# Patient Record
Sex: FEMALE | Race: WHITE | URBAN - NONMETROPOLITAN AREA
[De-identification: names, ages, dates, MRNs, and addresses within clinical notes are randomized per-mention and may not be internally consistent; named-entity substitution may affect disease eponyms.]

---

## 2018-12-14 ENCOUNTER — APPOINTMENT (RX ONLY)
Dept: URBAN - NONMETROPOLITAN AREA CLINIC 1 | Facility: CLINIC | Age: 78
Setting detail: DERMATOLOGY
End: 2018-12-14

## 2018-12-14 DIAGNOSIS — L57.0 ACTINIC KERATOSIS: ICD-10-CM

## 2018-12-14 DIAGNOSIS — L82.1 OTHER SEBORRHEIC KERATOSIS: ICD-10-CM

## 2018-12-14 PROCEDURE — 17003 DESTRUCT PREMALG LES 2-14: CPT

## 2018-12-14 PROCEDURE — 17000 DESTRUCT PREMALG LESION: CPT

## 2018-12-14 PROCEDURE — ? LIQUID NITROGEN

## 2018-12-14 PROCEDURE — ? COUNSELING

## 2018-12-14 PROCEDURE — 99202 OFFICE O/P NEW SF 15 MIN: CPT | Mod: 25

## 2018-12-14 ASSESSMENT — LOCATION DETAILED DESCRIPTION DERM
LOCATION DETAILED: LEFT PROXIMAL PRETIBIAL REGION
LOCATION DETAILED: RIGHT RADIAL DORSAL HAND
LOCATION DETAILED: LEFT LATERAL PROXIMAL PRETIBIAL REGION
LOCATION DETAILED: INFERIOR THORACIC SPINE
LOCATION DETAILED: RIGHT PROXIMAL DORSAL THUMB
LOCATION DETAILED: 2ND WEB SPACE RIGHT HAND
LOCATION DETAILED: RIGHT LATERAL EYEBROW
LOCATION DETAILED: RIGHT PROXIMAL DORSAL MIDDLE FINGER
LOCATION DETAILED: RIGHT ANTERIOR DISTAL THIGH
LOCATION DETAILED: RIGHT MEDIAL CANTHUS

## 2018-12-14 ASSESSMENT — LOCATION SIMPLE DESCRIPTION DERM
LOCATION SIMPLE: RIGHT THIGH
LOCATION SIMPLE: LEFT PRETIBIAL REGION
LOCATION SIMPLE: RIGHT EYELID
LOCATION SIMPLE: RIGHT HAND
LOCATION SIMPLE: UPPER BACK
LOCATION SIMPLE: RIGHT MIDDLE FINGER
LOCATION SIMPLE: RIGHT EYEBROW
LOCATION SIMPLE: RIGHT THUMB

## 2018-12-14 ASSESSMENT — LOCATION ZONE DERM
LOCATION ZONE: HAND
LOCATION ZONE: EYELID
LOCATION ZONE: LEG
LOCATION ZONE: FINGER
LOCATION ZONE: TRUNK
LOCATION ZONE: FACE

## 2019-03-08 ENCOUNTER — APPOINTMENT (RX ONLY)
Dept: URBAN - NONMETROPOLITAN AREA CLINIC 1 | Facility: CLINIC | Age: 79
Setting detail: DERMATOLOGY
End: 2019-03-08

## 2019-03-08 DIAGNOSIS — L72.0 EPIDERMAL CYST: ICD-10-CM

## 2019-03-08 PROBLEM — D48.5 NEOPLASM OF UNCERTAIN BEHAVIOR OF SKIN: Status: ACTIVE | Noted: 2019-03-08

## 2019-03-08 PROCEDURE — 11103 TANGNTL BX SKIN EA SEP/ADDL: CPT

## 2019-03-08 PROCEDURE — ? BENIGN DESTRUCTION COSMETIC

## 2019-03-08 PROCEDURE — 11102 TANGNTL BX SKIN SINGLE LES: CPT

## 2019-03-08 PROCEDURE — ? BIOPSY BY SHAVE METHOD

## 2019-03-08 ASSESSMENT — LOCATION DETAILED DESCRIPTION DERM: LOCATION DETAILED: LEFT INFERIOR CENTRAL MALAR CHEEK

## 2019-03-08 ASSESSMENT — LOCATION ZONE DERM: LOCATION ZONE: FACE

## 2019-03-08 ASSESSMENT — LOCATION SIMPLE DESCRIPTION DERM: LOCATION SIMPLE: LEFT CHEEK

## 2019-03-08 NOTE — PROCEDURE: BIOPSY BY SHAVE METHOD
Consent: Written consent was obtained and risks were reviewed including but not limited to scarring, infection, bleeding, scabbing, incomplete removal, nerve damage and allergy to anesthesia.
Anesthesia Type: 1% lidocaine with epinephrine and a 1:10 solution of 8.4% sodium bicarbonate
Lab Facility: [id number]
Destruction After The Procedure: Yes
Depth Of Biopsy: dermis
Cryotherapy Text: The wound bed was treated with cryotherapy after the biopsy was performed.
Bill For Surgical Tray: no
Size Of Lesion In Cm: 0.4
Lab: [id number]
Biopsy Type: H and E
Electrodesiccation Text: The wound bed was treated with electrodesiccation after the biopsy was performed.
Wound Care: Vaseline
Additional Anesthesia Volume In Cc (Will Not Render If 0): 0
Anesthesia Volume In Cc: 1
Dressing: Band-Aid
Electrodesiccation And Curettage Text: The wound bed was treated with electrodesiccation and curettage after the biopsy was performed.
Billing Type: Third-Party Bill
Type Of Destruction Used: Electrodesiccation and Curettage
Curettage Text: The wound bed was treated with curettage after the biopsy was done.
Hemostasis: Electrodesiccation
Notification Instructions: Patient will be notified of biopsy results. However, patient instructed to call the office if not contacted within 2 weeks.
Detail Level: Simple
Biopsy Method: Dermablade
Post-Care Instructions: I reviewed with the patient in detail post-care instructions. Patient is to keep the biopsy site dry overnight, and then apply Polysporin twice daily until healed. Patient may apply hydrogen peroxide soaks to remove any crusting.
Silver Nitrate Text: The wound bed was treated with silver nitrate after the biopsy was performed.
X Size Of Lesion In Cm: 0.3
Body Location Override (Optional - Billing Will Still Be Based On Selected Body Map Location If Applicable): r nasal root

## 2020-06-11 ENCOUNTER — APPOINTMENT (OUTPATIENT)
Dept: RADIOLOGY | Facility: MEDICAL CENTER | Age: 80
DRG: 175 | End: 2020-06-11
Attending: STUDENT IN AN ORGANIZED HEALTH CARE EDUCATION/TRAINING PROGRAM
Payer: COMMERCIAL

## 2020-06-11 ENCOUNTER — HOSPITAL ENCOUNTER (INPATIENT)
Facility: MEDICAL CENTER | Age: 80
LOS: 5 days | DRG: 175 | End: 2020-06-16
Attending: EMERGENCY MEDICINE | Admitting: INTERNAL MEDICINE
Payer: COMMERCIAL

## 2020-06-11 ENCOUNTER — APPOINTMENT (OUTPATIENT)
Dept: RADIOLOGY | Facility: MEDICAL CENTER | Age: 80
DRG: 175 | End: 2020-06-11
Attending: EMERGENCY MEDICINE
Payer: COMMERCIAL

## 2020-06-11 PROBLEM — R79.89 ELEVATED TROPONIN: Status: ACTIVE | Noted: 2020-06-11

## 2020-06-11 PROBLEM — N17.9 AKI (ACUTE KIDNEY INJURY) (HCC): Status: ACTIVE | Noted: 2020-06-11

## 2020-06-11 PROBLEM — J18.9 COMMUNITY ACQUIRED PNEUMONIA: Status: ACTIVE | Noted: 2020-06-11

## 2020-06-11 PROBLEM — I10 HYPERTENSION: Status: ACTIVE | Noted: 2020-06-11

## 2020-06-11 PROBLEM — E87.29 METABOLIC ACIDOSIS, INCREASED ANION GAP (IAG): Status: ACTIVE | Noted: 2020-06-11

## 2020-06-11 LAB
ALBUMIN SERPL BCP-MCNC: 3.6 G/DL (ref 3.2–4.9)
ALBUMIN/GLOB SERPL: 1.1 G/DL
ALP SERPL-CCNC: 81 U/L (ref 30–99)
ALT SERPL-CCNC: 26 U/L (ref 2–50)
ANION GAP SERPL CALC-SCNC: 21 MMOL/L (ref 7–16)
AST SERPL-CCNC: 36 U/L (ref 12–45)
BASOPHILS # BLD AUTO: 0.5 % (ref 0–1.8)
BASOPHILS # BLD: 0.06 K/UL (ref 0–0.12)
BILIRUB SERPL-MCNC: 0.8 MG/DL (ref 0.1–1.5)
BUN SERPL-MCNC: 35 MG/DL (ref 8–22)
CALCIUM SERPL-MCNC: 8.9 MG/DL (ref 8.5–10.5)
CHLORIDE SERPL-SCNC: 102 MMOL/L (ref 96–112)
CO2 SERPL-SCNC: 16 MMOL/L (ref 20–33)
COVID ORDER STATUS COVID19: NORMAL
CREAT SERPL-MCNC: 1.45 MG/DL (ref 0.5–1.4)
D DIMER PPP IA.FEU-MCNC: 17.03 UG/ML (FEU) (ref 0–0.5)
EKG IMPRESSION: NORMAL
EOSINOPHIL # BLD AUTO: 0.06 K/UL (ref 0–0.51)
EOSINOPHIL NFR BLD: 0.5 % (ref 0–6.9)
ERYTHROCYTE [DISTWIDTH] IN BLOOD BY AUTOMATED COUNT: 50.6 FL (ref 35.9–50)
GLOBULIN SER CALC-MCNC: 3.4 G/DL (ref 1.9–3.5)
GLUCOSE SERPL-MCNC: 127 MG/DL (ref 65–99)
HCT VFR BLD AUTO: 43.7 % (ref 37–47)
HGB BLD-MCNC: 14.2 G/DL (ref 12–16)
IMM GRANULOCYTES # BLD AUTO: 0.06 K/UL (ref 0–0.11)
IMM GRANULOCYTES NFR BLD AUTO: 0.5 % (ref 0–0.9)
LACTATE BLD-SCNC: 4.1 MMOL/L (ref 0.5–2)
LACTATE BLD-SCNC: 4.4 MMOL/L (ref 0.5–2)
LYMPHOCYTES # BLD AUTO: 1.87 K/UL (ref 1–4.8)
LYMPHOCYTES NFR BLD: 15.3 % (ref 22–41)
MAGNESIUM SERPL-MCNC: 2.4 MG/DL (ref 1.5–2.5)
MCH RBC QN AUTO: 31.3 PG (ref 27–33)
MCHC RBC AUTO-ENTMCNC: 32.5 G/DL (ref 33.6–35)
MCV RBC AUTO: 96.5 FL (ref 81.4–97.8)
MONOCYTES # BLD AUTO: 1.27 K/UL (ref 0–0.85)
MONOCYTES NFR BLD AUTO: 10.4 % (ref 0–13.4)
NEUTROPHILS # BLD AUTO: 8.88 K/UL (ref 2–7.15)
NEUTROPHILS NFR BLD: 72.8 % (ref 44–72)
NRBC # BLD AUTO: 0 K/UL
NRBC BLD-RTO: 0 /100 WBC
PLATELET # BLD AUTO: 175 K/UL (ref 164–446)
PMV BLD AUTO: 10.3 FL (ref 9–12.9)
POTASSIUM SERPL-SCNC: 5.2 MMOL/L (ref 3.6–5.5)
PROCALCITONIN SERPL-MCNC: <0.05 NG/ML
PROT SERPL-MCNC: 7 G/DL (ref 6–8.2)
RBC # BLD AUTO: 4.53 M/UL (ref 4.2–5.4)
SARS-COV-2 RNA RESP QL NAA+PROBE: NOTDETECTED
SODIUM SERPL-SCNC: 139 MMOL/L (ref 135–145)
SPECIMEN SOURCE: NORMAL
TROPONIN T SERPL-MCNC: 72 NG/L (ref 6–19)
TROPONIN T SERPL-MCNC: 73 NG/L (ref 6–19)
WBC # BLD AUTO: 12.2 K/UL (ref 4.8–10.8)

## 2020-06-11 PROCEDURE — 770020 HCHG ROOM/CARE - TELE (206)

## 2020-06-11 PROCEDURE — 87040 BLOOD CULTURE FOR BACTERIA: CPT | Mod: 91

## 2020-06-11 PROCEDURE — 85379 FIBRIN DEGRADATION QUANT: CPT

## 2020-06-11 PROCEDURE — 83605 ASSAY OF LACTIC ACID: CPT | Mod: 91

## 2020-06-11 PROCEDURE — 85025 COMPLETE CBC W/AUTO DIFF WBC: CPT

## 2020-06-11 PROCEDURE — 700117 HCHG RX CONTRAST REV CODE 255: Performed by: STUDENT IN AN ORGANIZED HEALTH CARE EDUCATION/TRAINING PROGRAM

## 2020-06-11 PROCEDURE — 93005 ELECTROCARDIOGRAM TRACING: CPT | Performed by: STUDENT IN AN ORGANIZED HEALTH CARE EDUCATION/TRAINING PROGRAM

## 2020-06-11 PROCEDURE — 96365 THER/PROPH/DIAG IV INF INIT: CPT

## 2020-06-11 PROCEDURE — 99285 EMERGENCY DEPT VISIT HI MDM: CPT

## 2020-06-11 PROCEDURE — 83735 ASSAY OF MAGNESIUM: CPT

## 2020-06-11 PROCEDURE — 700111 HCHG RX REV CODE 636 W/ 250 OVERRIDE (IP): Performed by: EMERGENCY MEDICINE

## 2020-06-11 PROCEDURE — C9803 HOPD COVID-19 SPEC COLLECT: HCPCS | Performed by: EMERGENCY MEDICINE

## 2020-06-11 PROCEDURE — 93971 EXTREMITY STUDY: CPT | Mod: RT

## 2020-06-11 PROCEDURE — 700105 HCHG RX REV CODE 258: Performed by: STUDENT IN AN ORGANIZED HEALTH CARE EDUCATION/TRAINING PROGRAM

## 2020-06-11 PROCEDURE — 700111 HCHG RX REV CODE 636 W/ 250 OVERRIDE (IP): Performed by: STUDENT IN AN ORGANIZED HEALTH CARE EDUCATION/TRAINING PROGRAM

## 2020-06-11 PROCEDURE — 84484 ASSAY OF TROPONIN QUANT: CPT

## 2020-06-11 PROCEDURE — 700101 HCHG RX REV CODE 250: Performed by: STUDENT IN AN ORGANIZED HEALTH CARE EDUCATION/TRAINING PROGRAM

## 2020-06-11 PROCEDURE — 93971 EXTREMITY STUDY: CPT | Mod: 26 | Performed by: INTERNAL MEDICINE

## 2020-06-11 PROCEDURE — 84145 PROCALCITONIN (PCT): CPT

## 2020-06-11 PROCEDURE — A9270 NON-COVERED ITEM OR SERVICE: HCPCS | Performed by: STUDENT IN AN ORGANIZED HEALTH CARE EDUCATION/TRAINING PROGRAM

## 2020-06-11 PROCEDURE — U0004 COV-19 TEST NON-CDC HGH THRU: HCPCS

## 2020-06-11 PROCEDURE — 700105 HCHG RX REV CODE 258: Performed by: EMERGENCY MEDICINE

## 2020-06-11 PROCEDURE — 96375 TX/PRO/DX INJ NEW DRUG ADDON: CPT

## 2020-06-11 PROCEDURE — 93005 ELECTROCARDIOGRAM TRACING: CPT | Performed by: EMERGENCY MEDICINE

## 2020-06-11 PROCEDURE — 700102 HCHG RX REV CODE 250 W/ 637 OVERRIDE(OP): Performed by: STUDENT IN AN ORGANIZED HEALTH CARE EDUCATION/TRAINING PROGRAM

## 2020-06-11 PROCEDURE — 71275 CT ANGIOGRAPHY CHEST: CPT

## 2020-06-11 PROCEDURE — 94640 AIRWAY INHALATION TREATMENT: CPT

## 2020-06-11 PROCEDURE — 71045 X-RAY EXAM CHEST 1 VIEW: CPT

## 2020-06-11 PROCEDURE — 80053 COMPREHEN METABOLIC PANEL: CPT

## 2020-06-11 RX ORDER — IPRATROPIUM BROMIDE AND ALBUTEROL SULFATE 2.5; .5 MG/3ML; MG/3ML
3 SOLUTION RESPIRATORY (INHALATION)
Status: DISCONTINUED | OUTPATIENT
Start: 2020-06-11 | End: 2020-06-11

## 2020-06-11 RX ORDER — AZITHROMYCIN 500 MG/1
500 INJECTION, POWDER, LYOPHILIZED, FOR SOLUTION INTRAVENOUS ONCE
Status: COMPLETED | OUTPATIENT
Start: 2020-06-11 | End: 2020-06-11

## 2020-06-11 RX ORDER — PREDNISONE 20 MG/1
40 TABLET ORAL DAILY
Status: DISCONTINUED | OUTPATIENT
Start: 2020-06-11 | End: 2020-06-12

## 2020-06-11 RX ORDER — CHOLECALCIFEROL (VITAMIN D3) 125 MCG
5000 CAPSULE ORAL DAILY
Status: ON HOLD | COMMUNITY
End: 2023-12-27

## 2020-06-11 RX ORDER — IPRATROPIUM BROMIDE AND ALBUTEROL SULFATE 2.5; .5 MG/3ML; MG/3ML
3 SOLUTION RESPIRATORY (INHALATION)
Status: DISCONTINUED | OUTPATIENT
Start: 2020-06-11 | End: 2020-06-12

## 2020-06-11 RX ORDER — LISINOPRIL 20 MG/1
20 TABLET ORAL DAILY
Status: DISCONTINUED | OUTPATIENT
Start: 2020-06-12 | End: 2020-06-11

## 2020-06-11 RX ORDER — FUROSEMIDE 20 MG/1
20 TABLET ORAL DAILY
Status: DISCONTINUED | OUTPATIENT
Start: 2020-06-12 | End: 2020-06-11

## 2020-06-11 RX ORDER — DILTIAZEM HYDROCHLORIDE 120 MG/1
240 TABLET, FILM COATED ORAL 3 TIMES DAILY
Status: DISCONTINUED | OUTPATIENT
Start: 2020-06-11 | End: 2020-06-12

## 2020-06-11 RX ORDER — DOXYCYCLINE 100 MG/1
100 TABLET ORAL EVERY 12 HOURS
Status: DISCONTINUED | OUTPATIENT
Start: 2020-06-12 | End: 2020-06-12

## 2020-06-11 RX ORDER — HEPARIN SODIUM 5000 [USP'U]/ML
5000 INJECTION, SOLUTION INTRAVENOUS; SUBCUTANEOUS EVERY 8 HOURS
Status: DISCONTINUED | OUTPATIENT
Start: 2020-06-11 | End: 2020-06-11

## 2020-06-11 RX ORDER — BUDESONIDE AND FORMOTEROL FUMARATE DIHYDRATE 160; 4.5 UG/1; UG/1
2 AEROSOL RESPIRATORY (INHALATION)
Status: DISCONTINUED | OUTPATIENT
Start: 2020-06-11 | End: 2020-06-12

## 2020-06-11 RX ORDER — SODIUM CHLORIDE, SODIUM LACTATE, POTASSIUM CHLORIDE, CALCIUM CHLORIDE 600; 310; 30; 20 MG/100ML; MG/100ML; MG/100ML; MG/100ML
1000 INJECTION, SOLUTION INTRAVENOUS ONCE
Status: COMPLETED | OUTPATIENT
Start: 2020-06-11 | End: 2020-06-11

## 2020-06-11 RX ORDER — LISINOPRIL 20 MG/1
20 TABLET ORAL DAILY
Status: ON HOLD | COMMUNITY
End: 2023-12-27

## 2020-06-11 RX ORDER — SODIUM CHLORIDE 9 MG/ML
INJECTION, SOLUTION INTRAVENOUS CONTINUOUS
Status: DISCONTINUED | OUTPATIENT
Start: 2020-06-11 | End: 2020-06-16

## 2020-06-11 RX ORDER — POLYETHYLENE GLYCOL 3350 17 G/17G
1 POWDER, FOR SOLUTION ORAL
Status: DISCONTINUED | OUTPATIENT
Start: 2020-06-11 | End: 2020-06-16 | Stop reason: HOSPADM

## 2020-06-11 RX ORDER — SODIUM CHLORIDE, SODIUM LACTATE, POTASSIUM CHLORIDE, CALCIUM CHLORIDE 600; 310; 30; 20 MG/100ML; MG/100ML; MG/100ML; MG/100ML
INJECTION, SOLUTION INTRAVENOUS CONTINUOUS
Status: DISCONTINUED | OUTPATIENT
Start: 2020-06-11 | End: 2020-06-11

## 2020-06-11 RX ORDER — DILTIAZEM HYDROCHLORIDE 120 MG/1
240 TABLET, FILM COATED ORAL 3 TIMES DAILY
Status: ON HOLD | COMMUNITY
End: 2023-12-27

## 2020-06-11 RX ORDER — POTASSIUM CHLORIDE 1.5 G/1.58G
20 POWDER, FOR SOLUTION ORAL 2 TIMES DAILY
Status: ON HOLD | COMMUNITY
End: 2023-12-27

## 2020-06-11 RX ORDER — MAGNESIUM OXIDE 400 MG/1
400 TABLET ORAL DAILY
Status: DISCONTINUED | OUTPATIENT
Start: 2020-06-12 | End: 2020-06-11

## 2020-06-11 RX ORDER — ALBUTEROL SULFATE 90 UG/1
2 AEROSOL, METERED RESPIRATORY (INHALATION) EVERY 6 HOURS PRN
Status: ON HOLD | COMMUNITY
End: 2023-12-27

## 2020-06-11 RX ORDER — MAGNESIUM OXIDE 400 MG/1
400 TABLET ORAL DAILY
Status: ON HOLD | COMMUNITY
End: 2023-12-27

## 2020-06-11 RX ORDER — FUROSEMIDE 20 MG/1
20 TABLET ORAL DAILY
Status: ON HOLD | COMMUNITY
End: 2023-12-27

## 2020-06-11 RX ORDER — LEVOTHYROXINE SODIUM 0.12 MG/1
125 TABLET ORAL
Status: ON HOLD | COMMUNITY
End: 2023-12-27

## 2020-06-11 RX ORDER — ALBUTEROL SULFATE 90 UG/1
2 AEROSOL, METERED RESPIRATORY (INHALATION) EVERY 6 HOURS PRN
Status: DISCONTINUED | OUTPATIENT
Start: 2020-06-11 | End: 2020-06-16 | Stop reason: HOSPADM

## 2020-06-11 RX ORDER — PREDNISONE 20 MG/1
40 TABLET ORAL DAILY
Status: DISCONTINUED | OUTPATIENT
Start: 2020-06-12 | End: 2020-06-11

## 2020-06-11 RX ORDER — AZITHROMYCIN 250 MG/1
500 TABLET, FILM COATED ORAL DAILY
Status: DISCONTINUED | OUTPATIENT
Start: 2020-06-12 | End: 2020-06-11

## 2020-06-11 RX ORDER — BISACODYL 10 MG
10 SUPPOSITORY, RECTAL RECTAL
Status: DISCONTINUED | OUTPATIENT
Start: 2020-06-11 | End: 2020-06-16 | Stop reason: HOSPADM

## 2020-06-11 RX ORDER — AMOXICILLIN 250 MG
2 CAPSULE ORAL 2 TIMES DAILY
Status: DISCONTINUED | OUTPATIENT
Start: 2020-06-11 | End: 2020-06-16 | Stop reason: HOSPADM

## 2020-06-11 RX ORDER — SODIUM CHLORIDE 9 MG/ML
INJECTION, SOLUTION INTRAVENOUS CONTINUOUS
Status: DISCONTINUED | OUTPATIENT
Start: 2020-06-11 | End: 2020-06-11

## 2020-06-11 RX ORDER — LEVOTHYROXINE SODIUM 0.12 MG/1
125 TABLET ORAL
Status: DISCONTINUED | OUTPATIENT
Start: 2020-06-12 | End: 2020-06-16 | Stop reason: HOSPADM

## 2020-06-11 RX ORDER — POTASSIUM CHLORIDE 1.5 G/1.58G
20 POWDER, FOR SOLUTION ORAL 2 TIMES DAILY
Status: DISCONTINUED | OUTPATIENT
Start: 2020-06-11 | End: 2020-06-11

## 2020-06-11 RX ORDER — BUDESONIDE 0.25 MG/2ML
250 INHALANT ORAL 2 TIMES DAILY
COMMUNITY

## 2020-06-11 RX ADMIN — SODIUM CHLORIDE, POTASSIUM CHLORIDE, SODIUM LACTATE AND CALCIUM CHLORIDE 1000 ML: 600; 310; 30; 20 INJECTION, SOLUTION INTRAVENOUS at 16:06

## 2020-06-11 RX ADMIN — IOHEXOL 45 ML: 350 INJECTION, SOLUTION INTRAVENOUS at 22:35

## 2020-06-11 RX ADMIN — CEFTRIAXONE SODIUM 2 G: 2 INJECTION, POWDER, FOR SOLUTION INTRAMUSCULAR; INTRAVENOUS at 17:13

## 2020-06-11 RX ADMIN — AZITHROMYCIN 500 MG: 500 INJECTION, POWDER, LYOPHILIZED, FOR SOLUTION INTRAVENOUS at 18:42

## 2020-06-11 RX ADMIN — SODIUM CHLORIDE, POTASSIUM CHLORIDE, SODIUM LACTATE AND CALCIUM CHLORIDE 1000 ML: 600; 310; 30; 20 INJECTION, SOLUTION INTRAVENOUS at 18:42

## 2020-06-11 RX ADMIN — IPRATROPIUM BROMIDE AND ALBUTEROL SULFATE 3 ML: .5; 3 SOLUTION RESPIRATORY (INHALATION) at 20:36

## 2020-06-11 RX ADMIN — SODIUM CHLORIDE: 9 INJECTION, SOLUTION INTRAVENOUS at 22:51

## 2020-06-11 RX ADMIN — HEPARIN SODIUM 5000 UNITS: 5000 INJECTION, SOLUTION INTRAVENOUS; SUBCUTANEOUS at 22:12

## 2020-06-11 RX ADMIN — PREDNISONE 40 MG: 20 TABLET ORAL at 22:12

## 2020-06-11 SDOH — HEALTH STABILITY: MENTAL HEALTH: HOW MANY STANDARD DRINKS CONTAINING ALCOHOL DO YOU HAVE ON A TYPICAL DAY?: 1 OR 2

## 2020-06-11 SDOH — HEALTH STABILITY: MENTAL HEALTH: HOW OFTEN DO YOU HAVE A DRINK CONTAINING ALCOHOL?: 4 OR MORE TIMES A WEEK

## 2020-06-11 ASSESSMENT — ENCOUNTER SYMPTOMS
SPEECH CHANGE: 0
MYALGIAS: 0
CONSTIPATION: 0
DOUBLE VISION: 0
TREMORS: 0
SHORTNESS OF BREATH: 1
FEVER: 0
FOCAL WEAKNESS: 0
WHEEZING: 1
VOMITING: 0
NAUSEA: 0
CHILLS: 0
SPUTUM PRODUCTION: 0
ORTHOPNEA: 0
BLURRED VISION: 0
COUGH: 1
DIARRHEA: 0
HEMOPTYSIS: 0
ABDOMINAL PAIN: 0
SENSORY CHANGE: 0
PALPITATIONS: 0

## 2020-06-11 ASSESSMENT — FIBROSIS 4 INDEX: FIB4 SCORE: 3.19

## 2020-06-11 ASSESSMENT — COPD QUESTIONNAIRES
HAVE YOU SMOKED AT LEAST 100 CIGARETTES IN YOUR ENTIRE LIFE: YES
DURING THE PAST 4 WEEKS HOW MUCH DID YOU FEEL SHORT OF BREATH: SOME OF THE TIME
DO YOU EVER COUGH UP ANY MUCUS OR PHLEGM?: NO/ONLY WITH OCCASIONAL COLDS OR INFECTIONS
COPD SCREENING SCORE: 6

## 2020-06-11 NOTE — ED PROVIDER NOTES
ED Provider Note    CHIEF COMPLAINT  Chief Complaint   Patient presents with   • Shortness of Breath     since saturday. Pt from [place], pt sating 88% RA. hx of asthma       HPI  Sheree Pinto is a 79 y.o. female who presents for evaluation of shortness of breath, mild cough.  The patient has a history of asthma.  She lives at [facility].  She bypassed her local [facility] hospital and came here by ambulance.  She apparently had been wheezing for several days.  She received breathing treatments prior to arrival.  She denies any COVID-19 positive contacts.  She denies productive cough or high fevers she denies chest pain or leg swelling.  She has never been hospitalized for her asthma.    REVIEW OF SYSTEMS  See HPI for further details.  No associated headache night sweats weight loss productive cough leg swelling all other systems are negative.     PAST MEDICAL HISTORY  Past Medical History:   Diagnosis Date   • Asthma    • Hypertension        FAMILY HISTORY  Noncontributory    SOCIAL HISTORY  Social History     Socioeconomic History   • Marital status: Not on file     Spouse name: Not on file   • Number of children: Not on file   • Years of education: Not on file   • Highest education level: Not on file   Occupational History   • Not on file   Social Needs   • Financial resource strain: Not on file   • Food insecurity     Worry: Not on file     Inability: Not on file   • Transportation needs     Medical: Not on file     Non-medical: Not on file   Tobacco Use   • Smoking status: Former Smoker     Last attempt to quit: 1970     Years since quittin.4   • Smokeless tobacco: Never Used   Substance and Sexual Activity   • Alcohol use: Yes     Alcohol/week: 8.4 oz     Types: 14 Glasses of wine per week     Frequency: 4 or more times a week     Drinks per session: 1 or 2   • Drug use: Never   • Sexual activity: Not on file   Lifestyle   • Physical activity     Days per week: Not on file     Minutes per session:  Not on file   • Stress: Not on file   Relationships   • Social connections     Talks on phone: Not on file     Gets together: Not on file     Attends [employer] service: Not on file     Active member of club or organization: Not on file     Attends meetings of clubs or organizations: Not on file     Relationship status: Not on file   • Intimate partner violence     Fear of current or ex partner: Not on file     Emotionally abused: Not on file     Physically abused: Not on file     Forced sexual activity: Not on file   Other Topics Concern   • Not on file   Social History Narrative   • Not on file     Former smoker  SURGICAL HISTORY  Past Surgical History:   Procedure Laterality Date   • APPENDECTOMY     • HYSTERECTOMY RADICAL     • THYROIDECTOMY         CURRENT MEDICATIONS    Current Facility-Administered Medications:   •  cefTRIAXone (ROCEPHIN) 2 g in  mL IVPB, 2 g, Intravenous, Once, Mj Houston M.D.  •  azithromycin (ZITHROMAX) injection 500 mg, 500 mg, Intravenous, Once, Mj Houston M.D.    Current Outpatient Medications:   •  lisinopril (PRINIVIL) 20 MG Tab, Take 20 mg by mouth every day., Disp: , Rfl:   •  DILTIAZem (CARDIZEM) 120 MG Tab, Take 240 mg by mouth 3 times a day., Disp: , Rfl:   •  potassium chloride (KLOR-CON) 20 MEQ Pack, Take 20 mEq by mouth 2 times a day., Disp: , Rfl:   •  levothyroxine (SYNTHROID) 125 MCG Tab, Take 125 mcg by mouth Every morning on an empty stomach., Disp: , Rfl:   •  albuterol 108 (90 Base) MCG/ACT Aero Soln inhalation aerosol, Inhale 2 Puffs by mouth every 6 hours as needed for Shortness of Breath., Disp: , Rfl:   •  budesonide (PULMICORT) 0.25 MG/2ML Suspension, 250 mcg by Nebulization route 2 times a day., Disp: , Rfl:   •  ipratropium (ATROVENT) 0.02 % Solution, 0.2 mg by Nebulization route., Disp: , Rfl:   •  NS SOLN 60 mL with albuterol 2.5 mg/0.5 mL NEBU 100 mg, by Nebulization route., Disp: , Rfl:   •  furosemide (LASIX) 20 MG Tab, Take 20 mg by mouth  "every day., Disp: , Rfl:   •  cyanocobalamin (VITAMIN B-12) 500 MCG Tab, Take 5,000 mcg by mouth every day., Disp: , Rfl:   •  iron dextran complex (INFED) 50 MG/ML Solution, 65 mg every day., Disp: , Rfl:   •  magnesium hydroxide (MILK OF MAGNESIA) 400 MG/5ML Suspension, Take 30 mL by mouth 1 time daily as needed., Disp: , Rfl:   •  magnesium oxide (MAG-OX) 400 MG Tab tablet, Take 400 mg by mouth every day., Disp: , Rfl:   To Nepro, amlodipine albuterol Synthroid    ALLERGIES  No Known Allergies    PHYSICAL EXAM  VITAL SIGNS: /67   Pulse 90   Temp 36.7 °C (98.1 °F) (Temporal)   Resp (!) 21   Ht 1.6 m (5' 3\")   Wt 62.6 kg (138 lb)   SpO2 91%   BMI 24.45 kg/m²       Constitutional: Early, ill-appearing  HENT: Normocephalic, Atraumatic, Bilateral external ears normal, Oropharynx moist, No oral exudates, Nose normal.   Eyes: PERRLA, EOMI, Conjunctiva normal, No discharge.   Neck: Normal range of motion, No tenderness, Supple, No stridor.   Lymphatic: No lymphadenopathy noted.   Cardiovascular: Normal heart rate, Normal rhythm, No murmurs, No rubs, No gallops.   Thorax & Lungs: Bilateral rhonchi no wheezing no respiratory distress no wheezing, No chest tenderness.   Abdomen: Bowel sounds normal, Soft, No tenderness, No masses, No pulsatile masses.   Skin: Warm, Dry, No erythema, No rash.   Back: No tenderness, No CVA tenderness.   Extremities: Intact distal pulses, No edema, No tenderness, No cyanosis, No clubbing.   Neurologic: Alert & oriented x 3, Normal motor function, Normal sensory function, No focal deficits noted.   Psychiatric: Affect normal, Judgment normal, Mood normal.     COURSE & MEDICAL DECISION MAKING  Pertinent Labs & Imaging studies reviewed. (See chart for details)  DX-CHEST-PORTABLE (1 VIEW)   Final Result      Likely layering pleural fluid especially on the left      Moderate cardiac silhouette enlargement with mild aortic ectasia        Results for orders placed or performed during the " hospital encounter of 06/11/20   LACTIC ACID   Result Value Ref Range    Lactic Acid 4.4 (HH) 0.5 - 2.0 mmol/L   CBC WITH DIFFERENTIAL   Result Value Ref Range    WBC 12.2 (H) 4.8 - 10.8 K/uL    RBC 4.53 4.20 - 5.40 M/uL    Hemoglobin 14.2 12.0 - 16.0 g/dL    Hematocrit 43.7 37.0 - 47.0 %    MCV 96.5 81.4 - 97.8 fL    MCH 31.3 27.0 - 33.0 pg    MCHC 32.5 (L) 33.6 - 35.0 g/dL    RDW 50.6 (H) 35.9 - 50.0 fL    Platelet Count 175 164 - 446 K/uL    MPV 10.3 9.0 - 12.9 fL    Neutrophils-Polys 72.80 (H) 44.00 - 72.00 %    Lymphocytes 15.30 (L) 22.00 - 41.00 %    Monocytes 10.40 0.00 - 13.40 %    Eosinophils 0.50 0.00 - 6.90 %    Basophils 0.50 0.00 - 1.80 %    Immature Granulocytes 0.50 0.00 - 0.90 %    Nucleated RBC 0.00 /100 WBC    Neutrophils (Absolute) 8.88 (H) 2.00 - 7.15 K/uL    Lymphs (Absolute) 1.87 1.00 - 4.80 K/uL    Monos (Absolute) 1.27 (H) 0.00 - 0.85 K/uL    Eos (Absolute) 0.06 0.00 - 0.51 K/uL    Baso (Absolute) 0.06 0.00 - 0.12 K/uL    Immature Granulocytes (abs) 0.06 0.00 - 0.11 K/uL    NRBC (Absolute) 0.00 K/uL   Comp Metabolic Panel   Result Value Ref Range    Sodium 139 135 - 145 mmol/L    Potassium 5.2 3.6 - 5.5 mmol/L    Chloride 102 96 - 112 mmol/L    Co2 16 (L) 20 - 33 mmol/L    Anion Gap 21.0 (H) 7.0 - 16.0    Glucose 127 (H) 65 - 99 mg/dL    Bun 35 (H) 8 - 22 mg/dL    Creatinine 1.45 (H) 0.50 - 1.40 mg/dL    Calcium 8.9 8.5 - 10.5 mg/dL    AST(SGOT) 36 12 - 45 U/L    ALT(SGPT) 26 2 - 50 U/L    Alkaline Phosphatase 81 30 - 99 U/L    Total Bilirubin 0.8 0.1 - 1.5 mg/dL    Albumin 3.6 3.2 - 4.9 g/dL    Total Protein 7.0 6.0 - 8.2 g/dL    Globulin 3.4 1.9 - 3.5 g/dL    A-G Ratio 1.1 g/dL   TROPONIN   Result Value Ref Range    Troponin T 73 (H) 6 - 19 ng/L   PROCALCITONIN   Result Value Ref Range    Procalcitonin <0.05 <0.25 ng/mL   COVID/SARS CoV-2    Specimen: Nasopharyngeal; Respirate   Result Value Ref Range    COVID Order Status Received    SARS-CoV-2, PCR (In-House)   Result Value Ref Range     SARS-CoV-2 Source NP Swab    ESTIMATED GFR   Result Value Ref Range    GFR If  42 (A) >60 mL/min/1.73 m 2    GFR If Non African American 35 (A) >60 mL/min/1.73 m 2   EKG   Result Value Ref Range    Report       [facility]    Test Date:  2020  Pt Name:    MICHAEL MATAMOROS                Department: ER  MRN:        [id number]                      Room:       RD 11  Gender:     Female                       Technician: [id number]  :        [id number]                   Requested By:ER TRIAGE PROTOCOL  Order #:    [id number]                    Reading MD:    Measurements  Intervals                                Axis  Rate:       88                           P:          69  NY:         144                          QRS:        -68  QRSD:       80                           T:          -5  QT:         400  QTc:        484    Interpretive Statements  SINUS RHYTHM  LEFT ANTERIOR FASCICULAR BLOCK  BORDERLINE LOW VOLTAGE IN FRONTAL LEADS  BORDERLINE R WAVE PROGRESSION, ANTERIOR LEADS  NONSPECIFIC T ABNORMALITIES, ANTERIOR LEADS  No previous ECG available for comparison        EKG interpretation by me rate 88 left anterior fascicular block low voltage in the frontal leads poor R wave progression no acute ST segment elevation nonspecific T wave changes in the anterior leads    Patient presents here with cough congestion and borderline hypoxia.  Chest x-ray demonstrates likely left lower lobe pneumonia.  She has leukocytosis and left shift.  She is also dehydrated with an elevated BUN and creatinine.  Lactic acid is elevated at 4.4.  COVID test is negative.  Blood cultures are pending.  She was given Rocephin and azithromycin and will be admitted to internal medicine  FINAL IMPRESSION  1.  Community-acquired pneumonia  2.  Mild ARLET  3.  INdeterminate troponin  4.  Lactic acidemia      Electronically signed by: Mj Houston M.D., 2020 3:08 PM

## 2020-06-11 NOTE — ED TRIAGE NOTES
Pt BIB EMS c/o SOB since Saturday. Pt has hx of asthma. Pt has been using inhaler, and no relief of symptoms. Pt currently appears in no acute distress, sating 93% on RA. Pt from [place].

## 2020-06-12 ENCOUNTER — APPOINTMENT (OUTPATIENT)
Dept: CARDIOLOGY | Facility: MEDICAL CENTER | Age: 80
DRG: 175 | End: 2020-06-12
Attending: STUDENT IN AN ORGANIZED HEALTH CARE EDUCATION/TRAINING PROGRAM
Payer: COMMERCIAL

## 2020-06-12 PROBLEM — I27.20 PULMONARY HYPERTENSION (HCC): Status: ACTIVE | Noted: 2020-06-12

## 2020-06-12 PROBLEM — I26.09 ACUTE PULMONARY EMBOLISM WITH ACUTE COR PULMONALE (HCC): Status: ACTIVE | Noted: 2020-06-12

## 2020-06-12 PROBLEM — C50.912 MALIGNANT NEOPLASM OF LEFT BREAST (HCC): Status: ACTIVE | Noted: 2020-06-12

## 2020-06-12 PROBLEM — I26.99 PE (PULMONARY THROMBOEMBOLISM) (HCC): Status: ACTIVE | Noted: 2020-06-12

## 2020-06-12 PROBLEM — R79.89 ELEVATED BRAIN NATRIURETIC PEPTIDE (BNP) LEVEL: Status: ACTIVE | Noted: 2020-06-12

## 2020-06-12 PROBLEM — R79.89 POSITIVE D DIMER: Status: ACTIVE | Noted: 2020-06-12

## 2020-06-12 PROBLEM — E87.29 METABOLIC ACIDOSIS, INCREASED ANION GAP (IAG): Status: RESOLVED | Noted: 2020-06-11 | Resolved: 2020-06-12

## 2020-06-12 LAB
ALBUMIN SERPL BCP-MCNC: 2.6 G/DL (ref 3.2–4.9)
ALBUMIN/GLOB SERPL: 1.1 G/DL
ALP SERPL-CCNC: 62 U/L (ref 30–99)
ALT SERPL-CCNC: 20 U/L (ref 2–50)
ANION GAP SERPL CALC-SCNC: 14 MMOL/L (ref 7–16)
APTT PPP: 215.8 SEC (ref 24.7–36)
APTT PPP: >240 SEC (ref 24.7–36)
AST SERPL-CCNC: 24 U/L (ref 12–45)
BASOPHILS # BLD AUTO: 0.5 % (ref 0–1.8)
BASOPHILS # BLD: 0.05 K/UL (ref 0–0.12)
BILIRUB SERPL-MCNC: 0.3 MG/DL (ref 0.1–1.5)
BUN SERPL-MCNC: 31 MG/DL (ref 8–22)
CALCIUM SERPL-MCNC: 8.4 MG/DL (ref 8.5–10.5)
CHLORIDE SERPL-SCNC: 106 MMOL/L (ref 96–112)
CO2 SERPL-SCNC: 18 MMOL/L (ref 20–33)
CREAT SERPL-MCNC: 1.13 MG/DL (ref 0.5–1.4)
EKG IMPRESSION: NORMAL
EOSINOPHIL # BLD AUTO: 0.09 K/UL (ref 0–0.51)
EOSINOPHIL NFR BLD: 0.9 % (ref 0–6.9)
ERYTHROCYTE [DISTWIDTH] IN BLOOD BY AUTOMATED COUNT: 51.2 FL (ref 35.9–50)
GLOBULIN SER CALC-MCNC: 2.4 G/DL (ref 1.9–3.5)
GLUCOSE SERPL-MCNC: 113 MG/DL (ref 65–99)
HCT VFR BLD AUTO: 37.9 % (ref 37–47)
HGB BLD-MCNC: 12 G/DL (ref 12–16)
IMM GRANULOCYTES # BLD AUTO: 0.05 K/UL (ref 0–0.11)
IMM GRANULOCYTES NFR BLD AUTO: 0.5 % (ref 0–0.9)
INR PPP: 1.25 (ref 0.87–1.13)
INR PPP: 1.34 (ref 0.87–1.13)
LACTATE BLD-SCNC: 1.7 MMOL/L (ref 0.5–2)
LV EJECT FRACT  99904: 60
LV EJECT FRACT MOD 2C 99903: 64.12
LV EJECT FRACT MOD 4C 99902: 55.79
LV EJECT FRACT MOD BP 99901: 60.39
LYMPHOCYTES # BLD AUTO: 1.12 K/UL (ref 1–4.8)
LYMPHOCYTES NFR BLD: 10.8 % (ref 22–41)
MAGNESIUM SERPL-MCNC: 2.2 MG/DL (ref 1.5–2.5)
MCH RBC QN AUTO: 31 PG (ref 27–33)
MCHC RBC AUTO-ENTMCNC: 31.7 G/DL (ref 33.6–35)
MCV RBC AUTO: 97.9 FL (ref 81.4–97.8)
MONOCYTES # BLD AUTO: 0.93 K/UL (ref 0–0.85)
MONOCYTES NFR BLD AUTO: 9 % (ref 0–13.4)
NEUTROPHILS # BLD AUTO: 8.1 K/UL (ref 2–7.15)
NEUTROPHILS NFR BLD: 78.3 % (ref 44–72)
NRBC # BLD AUTO: 0 K/UL
NRBC BLD-RTO: 0 /100 WBC
NT-PROBNP SERPL IA-MCNC: 9641 PG/ML (ref 0–125)
PLATELET # BLD AUTO: 139 K/UL (ref 164–446)
PMV BLD AUTO: 10.2 FL (ref 9–12.9)
POTASSIUM SERPL-SCNC: 4.6 MMOL/L (ref 3.6–5.5)
PROT SERPL-MCNC: 5 G/DL (ref 6–8.2)
PROTHROMBIN TIME: 16 SEC (ref 12–14.6)
PROTHROMBIN TIME: 16.9 SEC (ref 12–14.6)
RBC # BLD AUTO: 3.87 M/UL (ref 4.2–5.4)
SODIUM SERPL-SCNC: 138 MMOL/L (ref 135–145)
TSH SERPL DL<=0.005 MIU/L-ACNC: 1.97 UIU/ML (ref 0.38–5.33)
WBC # BLD AUTO: 10.3 K/UL (ref 4.8–10.8)

## 2020-06-12 PROCEDURE — 93306 TTE W/DOPPLER COMPLETE: CPT | Mod: 26 | Performed by: INTERNAL MEDICINE

## 2020-06-12 PROCEDURE — 83605 ASSAY OF LACTIC ACID: CPT

## 2020-06-12 PROCEDURE — 80053 COMPREHEN METABOLIC PANEL: CPT

## 2020-06-12 PROCEDURE — 93306 TTE W/DOPPLER COMPLETE: CPT

## 2020-06-12 PROCEDURE — 770022 HCHG ROOM/CARE - ICU (200)

## 2020-06-12 PROCEDURE — 99291 CRITICAL CARE FIRST HOUR: CPT | Performed by: INTERNAL MEDICINE

## 2020-06-12 PROCEDURE — 36415 COLL VENOUS BLD VENIPUNCTURE: CPT

## 2020-06-12 PROCEDURE — 700105 HCHG RX REV CODE 258: Performed by: INTERNAL MEDICINE

## 2020-06-12 PROCEDURE — 700111 HCHG RX REV CODE 636 W/ 250 OVERRIDE (IP): Performed by: STUDENT IN AN ORGANIZED HEALTH CARE EDUCATION/TRAINING PROGRAM

## 2020-06-12 PROCEDURE — 99292 CRITICAL CARE ADDL 30 MIN: CPT | Performed by: INTERNAL MEDICINE

## 2020-06-12 PROCEDURE — 84443 ASSAY THYROID STIM HORMONE: CPT

## 2020-06-12 PROCEDURE — 85025 COMPLETE CBC W/AUTO DIFF WBC: CPT

## 2020-06-12 PROCEDURE — 700105 HCHG RX REV CODE 258: Performed by: STUDENT IN AN ORGANIZED HEALTH CARE EDUCATION/TRAINING PROGRAM

## 2020-06-12 PROCEDURE — 85610 PROTHROMBIN TIME: CPT

## 2020-06-12 PROCEDURE — 85730 THROMBOPLASTIN TIME PARTIAL: CPT

## 2020-06-12 PROCEDURE — 93010 ELECTROCARDIOGRAM REPORT: CPT | Performed by: INTERNAL MEDICINE

## 2020-06-12 PROCEDURE — 700111 HCHG RX REV CODE 636 W/ 250 OVERRIDE (IP): Mod: JW,JG | Performed by: INTERNAL MEDICINE

## 2020-06-12 PROCEDURE — 700102 HCHG RX REV CODE 250 W/ 637 OVERRIDE(OP): Performed by: STUDENT IN AN ORGANIZED HEALTH CARE EDUCATION/TRAINING PROGRAM

## 2020-06-12 PROCEDURE — A9270 NON-COVERED ITEM OR SERVICE: HCPCS | Performed by: STUDENT IN AN ORGANIZED HEALTH CARE EDUCATION/TRAINING PROGRAM

## 2020-06-12 PROCEDURE — 83880 ASSAY OF NATRIURETIC PEPTIDE: CPT

## 2020-06-12 PROCEDURE — 83735 ASSAY OF MAGNESIUM: CPT

## 2020-06-12 RX ORDER — HEPARIN SODIUM 5000 [USP'U]/100ML
INJECTION, SOLUTION INTRAVENOUS CONTINUOUS
Status: DISCONTINUED | OUTPATIENT
Start: 2020-06-12 | End: 2020-06-14 | Stop reason: ALTCHOICE

## 2020-06-12 RX ORDER — HEPARIN SODIUM 1000 [USP'U]/ML
3700 INJECTION, SOLUTION INTRAVENOUS; SUBCUTANEOUS PRN
Status: DISCONTINUED | OUTPATIENT
Start: 2020-06-12 | End: 2020-06-14 | Stop reason: ALTCHOICE

## 2020-06-12 RX ORDER — HEPARIN SODIUM 1000 [USP'U]/ML
5000 INJECTION, SOLUTION INTRAVENOUS; SUBCUTANEOUS ONCE
Status: COMPLETED | OUTPATIENT
Start: 2020-06-12 | End: 2020-06-12

## 2020-06-12 RX ORDER — SODIUM CHLORIDE 9 MG/ML
INJECTION, SOLUTION INTRAVENOUS ONCE
Status: COMPLETED | OUTPATIENT
Start: 2020-06-12 | End: 2020-06-12

## 2020-06-12 RX ORDER — HEPARIN SODIUM 1000 [USP'U]/ML
2600 INJECTION, SOLUTION INTRAVENOUS; SUBCUTANEOUS PRN
Status: DISCONTINUED | OUTPATIENT
Start: 2020-06-12 | End: 2020-06-12

## 2020-06-12 RX ORDER — HEPARIN SODIUM 5000 [USP'U]/100ML
INJECTION, SOLUTION INTRAVENOUS CONTINUOUS
Status: DISCONTINUED | OUTPATIENT
Start: 2020-06-12 | End: 2020-06-12

## 2020-06-12 RX ORDER — NOREPINEPHRINE BITARTRATE 0.03 MG/ML
0-30 INJECTION, SOLUTION INTRAVENOUS CONTINUOUS
Status: DISCONTINUED | OUTPATIENT
Start: 2020-06-12 | End: 2020-06-12

## 2020-06-12 RX ADMIN — SODIUM CHLORIDE: 9 INJECTION, SOLUTION INTRAVENOUS at 11:58

## 2020-06-12 RX ADMIN — HEPARIN SODIUM 1050 UNITS/HR: 5000 INJECTION, SOLUTION INTRAVENOUS at 01:23

## 2020-06-12 RX ADMIN — ALTEPLASE 10 MG: KIT at 08:50

## 2020-06-12 RX ADMIN — SODIUM CHLORIDE 30 ML: 9 INJECTION, SOLUTION INTRAVENOUS at 10:10

## 2020-06-12 RX ADMIN — ALTEPLASE 40 MG: KIT at 08:56

## 2020-06-12 RX ADMIN — HEPARIN SODIUM 750 UNITS/HR: 5000 INJECTION, SOLUTION INTRAVENOUS at 11:50

## 2020-06-12 RX ADMIN — DILTIAZEM HYDROCHLORIDE 240 MG: 120 TABLET, FILM COATED ORAL at 05:54

## 2020-06-12 RX ADMIN — LEVOTHYROXINE SODIUM 125 MCG: 125 TABLET ORAL at 05:54

## 2020-06-12 RX ADMIN — HEPARIN SODIUM 3700 UNITS: 1000 INJECTION, SOLUTION INTRAVENOUS; SUBCUTANEOUS at 11:49

## 2020-06-12 RX ADMIN — HEPARIN SODIUM 5000 UNITS: 1000 INJECTION, SOLUTION INTRAVENOUS; SUBCUTANEOUS at 01:23

## 2020-06-12 ASSESSMENT — ENCOUNTER SYMPTOMS
DIARRHEA: 0
BACK PAIN: 0
COUGH: 1
ORTHOPNEA: 0
PHOTOPHOBIA: 0
STRIDOR: 0
BRUISES/BLEEDS EASILY: 0
WHEEZING: 0
HEADACHES: 0
MYALGIAS: 0
EYE DISCHARGE: 0
DIAPHORESIS: 0
SENSORY CHANGE: 0
NAUSEA: 0
NERVOUS/ANXIOUS: 0
TINGLING: 0
NECK PAIN: 0
DOUBLE VISION: 0
PALPITATIONS: 0
FOCAL WEAKNESS: 0
SPEECH CHANGE: 0
BLURRED VISION: 0
EYE REDNESS: 0
ABDOMINAL PAIN: 0
HEMOPTYSIS: 0
WHEEZING: 1
FEVER: 0
POLYDIPSIA: 0
COUGH: 0
SEIZURES: 0
SINUS PAIN: 0
BLOOD IN STOOL: 0
SPUTUM PRODUCTION: 0
EYE PAIN: 0
CHILLS: 0
CONSTIPATION: 0
HALLUCINATIONS: 0
HEARTBURN: 0
DEPRESSION: 0
VOMITING: 0
SHORTNESS OF BREATH: 1
DIZZINESS: 0
SPUTUM PRODUCTION: 1
WEIGHT LOSS: 0
TREMORS: 0

## 2020-06-12 NOTE — PROGRESS NOTES
[facility] ICU Progress Note      Admit Date: 6/11/2020    Resident(s): Della Hernandez M.D.   Attending:  LEO MOSER/ Dr. Hawley    Patient ID:    Name:  Sheree Pinto   YOB: 1940  Age:  79 y.o.  female   MRN:  [id number]    Hospital Course (carried forward and updated):  Sheere is a 79 y.o. female who presented on  6/11/2020 for evaluation of dyspnea on exertion , onset 5 days ago.Ongoing medical conditions include Hyperthyroidism S/p thyroid resection, currently on Levothyroxine and  Hypertension. She was diagnosed with submassive PE on CTA.  She has RV distension on ct imaging and on ECHO. She is on oxygen 3 L nc, resting comfortably.  She is not on oxygen at home.  She is on lasix for chronic lower ext swelling.     Consultants:  Critical Care  IR    Interval Events:  - Received 50 mg alteplase instead of 100. This was systemic. Plan to observe her in the ICU for 24 hours and follow up with neurochecks.   - BP ranging from  SBP. Will consider levophed and central line if low.   - In sinus thyrhm    Review of Systems   Constitutional: Positive for malaise/fatigue. Negative for chills and fever.   HENT: Negative for ear discharge and ear pain.    Eyes: Negative for blurred vision and double vision.   Respiratory: Positive for cough, shortness of breath and wheezing. Negative for hemoptysis and sputum production.    Cardiovascular: Positive for leg swelling. Negative for chest pain, palpitations and orthopnea.   Gastrointestinal: Negative for abdominal pain, constipation, diarrhea, nausea and vomiting.   Genitourinary: Negative for dysuria, frequency and urgency.   Musculoskeletal: Negative for myalgias.   Neurological: Negative for tremors, sensory change, speech change and focal weakness.       PHYSICAL EXAM:  Vitals:    06/11/20 2039 06/11/20 2330 06/12/20 0308 06/12/20 0338   BP:  (!) 99/55  (!) 89/56   Pulse: 79 77 75 77   Resp: 16 18 16 18   Temp:  36 °C (96.8 °F)  36.4 °C  "(97.6 °F)   TempSrc:  Temporal  Temporal   SpO2: 97% 97% 97% 96%   Weight:       Height:        Body mass index is 24.68 kg/m².  Latest Vitals:  BP (!) 89/56 Comment: RN notified  Pulse 77   Temp 36.4 °C (97.6 °F) (Temporal)   Resp 18   Ht 1.6 m (5' 3\")   Wt 63.2 kg (139 lb 5.3 oz)   SpO2 96%   BMI 24.68 kg/m²   O2 therapy: Pulse Oximetry: 96 %, O2 (LPM): 3, O2 Delivery Device: Silicone Nasal Cannula  Vitals Range last 24h:  Temp:  [35.8 °C (96.5 °F)-36.7 °C (98.1 °F)] 36.4 °C (97.6 °F)  Pulse:  [75-90] 77  Resp:  [16-21] 18  BP: ()/(55-70) 89/56  SpO2:  [91 %-100 %] 96 %       Intake/Output Summary (Last 24 hours) at 6/12/2020 0836  Last data filed at 6/11/2020 1833  Gross per 24 hour   Intake 1100 ml   Output --   Net 1100 ml        Physical Exam   Constitutional: She is oriented to person, place, and time and well-developed, well-nourished, and in no distress. No distress.   HENT:   Head: Normocephalic and atraumatic.   Eyes: Pupils are equal, round, and reactive to light. EOM are normal.   Neck: Normal range of motion. Neck supple.   Cardiovascular: Normal rate, regular rhythm and normal heart sounds.   No murmur heard.  Pulmonary/Chest: Effort normal and breath sounds normal. She has no wheezes.   Reduced breath sounds bilaterally   Abdominal: Soft. Bowel sounds are normal. There is no abdominal tenderness. There is no rebound.   Musculoskeletal: Normal range of motion.         General: No edema.   Neurological: She is alert and oriented to person, place, and time.   Skin: Skin is warm and dry.         Recent Labs     06/11/20  1503 06/12/20  0037 06/12/20  0248   SODIUM 139 138  --    POTASSIUM 5.2 4.6  --    CHLORIDE 102 106  --    CO2 16* 18*  --    BUN 35* 31*  --    CREATININE 1.45* 1.13  --    MAGNESIUM 2.4  --  2.2   CALCIUM 8.9 8.4*  --      Recent Labs     06/11/20  1503 06/12/20  0037   ALTSGPT 26 20   ASTSGOT 36 24   ALKPHOSPHAT 81 62   TBILIRUBIN 0.8 0.3   GLUCOSE 127* 113*     Recent " Labs     06/11/20  1503 06/12/20  0037 06/12/20  0248 06/12/20  0634   RBC 4.53 3.87*  --   --    HEMOGLOBIN 14.2 12.0  --   --    HEMATOCRIT 43.7 37.9  --   --    PLATELETCT 175 139*  --   --    PROTHROMBTM  --   --  16.9*  --    APTT  --   --   --  >240.0*   INR  --   --  1.34*  --      Recent Labs     06/11/20  1503 06/12/20  0037   WBC 12.2* 10.3   NEUTSPOLYS 72.80* 78.30*   LYMPHOCYTES 15.30* 10.80*   MONOCYTES 10.40 9.00   EOSINOPHILS 0.50 0.90   BASOPHILS 0.50 0.50   ASTSGOT 36 24   ALTSGPT 26 20   ALKPHOSPHAT 81 62   TBILIRUBIN 0.8 0.3       Meds:  • alteplase  10 mg      Followed by   • alteplase  40 mg      Followed by   • NS       • norepinephrine (Levophed) infusion  0-30 mcg/min     • heparin  3,700 Units      And   • heparin       • senna-docusate  2 Tab      And   • polyethylene glycol/lytes  1 Packet      And   • magnesium hydroxide  30 mL      And   • bisacodyl  10 mg     • Respiratory Therapy Consult       • albuterol  2 Puff     • levothyroxine  125 mcg     • NS   100 mL/hr at 06/11/20 6844        Procedures:  None    Imaging:  EC-ECHOCARDIOGRAM COMPLETE W/O CONT   Final Result      CT-CTA CHEST PULMONARY ARTERY W/ RECONS   Final Result         1.  Extensive bilateral pulmonary emboli involving the main pulmonary arteries, bilateral lower lobe, right middle lobe, and lingular segmental and subsegmental branches.   2.  Appearance of heart suggests component of ventricular strain   3.  Small left pleural effusion   4.  Hepatomegaly   5.  Atherosclerosis and atherosclerotic coronary artery disease.      These findings were discussed with the patient's clinician, Dr. Rubalcava, on 6/11/2020 11:05 PM.      US-EXTREMITY VENOUS LOWER UNILAT RIGHT   Final Result      DX-CHEST-PORTABLE (1 VIEW)   Final Result      Likely layering pleural fluid especially on the left      Moderate cardiac silhouette enlargement with mild aortic ectasia          Problem and Plan:  PE (pulmonary thromboembolism) (HCC)- (present on  admission)  Assessment & Plan  On CT PE: Extensive bilateral pulmonary emboli involving the main pulmonary arteries, bilateral lower lobe, right middle lobe, and lingular segmental and subsegmental branches. Signs of right heart strain on CT and ECHO.  - Received 50 mg alteplase in the ICU.  - on 3 L O2 now.   - US DVT negative     ARLET (acute kidney injury) (HCC)- (present on admission)  Assessment & Plan  Patient presents with mild ARLET, BUN 35, creatinine 1.45  Unknown baseline  Bun to creatinine ratio more than 20 indicating prerenal/postrenal etiology  Denies any urinary symptoms, likely prerenal secondary to dehydration in the setting of ongoing infection    Plan  -Improved with IV fluids.     Hypertension- (present on admission)  Assessment & Plan  History of hypertension   On lisinopril and diltiazem ( states its for Hypertension) denies history of Atrial fibrillation   Holding BP medications since she had low blood pressure.     Elevated troponin- (present on admission)  Assessment & Plan  Elevated troponin on presentation   Asymptomatic     PLAN   - repeat trop remained same. No chest pain or EKG changes.   - ECHO was unremarkable with EF 60%. Probably from the PE and right heart strain    DISPO: To remain in the ICU for monitoring    CODE STATUS: DNAR I OK    Quality Measures:  Feeding: Oral  Analgesia: None  Sedation: None  Thromboprophylaxis: Contraindicated  Head of bed: >30 degrees  Ulcer prophylaxis: None  Glycemic control: None  Bowel care: bowel regimen  Indwelling lines: PIV  Deescalation of antibiotics: None    Della Hernandez M.D.

## 2020-06-12 NOTE — PROGRESS NOTES
2 RN skin check complete.   Confirmed pressure ulcers found on none.  New potential pressure ulcers noted on none.   The following interventions in place two hour turing encouraged.

## 2020-06-12 NOTE — ASSESSMENT & PLAN NOTE
Presenting with shortness of breath on exertion, onset 5 days ago.  Denies any recent worsening cough, fever, chills, recent ill contacts or preceding viral symptoms.  On admission Sirs 2/4 with tachypnea and leukocytosis  Chest x-ray concerning for left lower lobe pneumonia, procalcitonin normal, COVID-19 negative  - Completed 5 days of antibiotic therapy

## 2020-06-12 NOTE — ASSESSMENT & PLAN NOTE
On CT PE: Extensive bilateral pulmonary emboli involving the main pulmonary arteries, bilateral lower lobe, right middle lobe, and lingular segmental and subsegmental branches. Signs of right heart strain on CT and ECHO.  --changing heparin to 10mg BID for 7 days (then 5mg BID until has follow up outpatient)  - on 3 L O2 now.   - US DVT negative   - PE provoked secondary to breast Ca.

## 2020-06-12 NOTE — ASSESSMENT & PLAN NOTE
History of hypertension   On lisinopril and diltiazem ( states its for Hypertension) denies history of Atrial fibrillation   Holding BP medications given hypotensive in ICU; today stopped midodrine. If BPs stable and rising may restart anti-hypertensives

## 2020-06-12 NOTE — ASSESSMENT & PLAN NOTE
Patient presents with mild ARLET, BUN 35, creatinine 1.45  Unknown baseline  Bun to creatinine ratio more than 20 indicating prerenal/postrenal etiology  Denies any urinary symptoms, likely prerenal secondary to dehydration in the setting of ongoing infection    Plan  -Improved with IV fluids.

## 2020-06-12 NOTE — ASSESSMENT & PLAN NOTE
Acute submassive pulmonary embolism  Associated right heart strain and pulmonary hypertension  S/P 50 mg of alteplase  Continue heparin drip on the post fibrinolytic protocol

## 2020-06-12 NOTE — ASSESSMENT & PLAN NOTE
Her blood pressure was running low this morning  Improved with IV fluids and midodrine  Decrease midodrine, 5 mg 3 times daily

## 2020-06-12 NOTE — CARE PLAN
Problem: Safety  Goal: Will remain free from falls  Intervention: Assess risk factors for falls  Note: Bed in lowest position. Non-skid socks in place. Personal possessions within reach. Mobility sign on door. Bed-alarm on. Call light within reach. Pt educated regarding fall prevention and states understanding. [place] fall assessment complete.       Problem: Venous Thromboembolism (VTW)/Deep Vein Thrombosis (DVT) Prevention:  Goal: Patient will participate in Venous Thrombosis (VTE)/Deep Vein Thrombosis (DVT)Prevention Measures  Intervention: Assess and monitor for anticoagulation complications  Note: Patients individual infection risk assessed. Monitored for signs and symptoms of infection throughout shift. Washed hands or foamed in and out every encounter. Utilized universal precautions. Scrubbed hub before access to IV lines per protocol.

## 2020-06-12 NOTE — ASSESSMENT & PLAN NOTE
On ceftriaxone and azithromycin  Ct changes possibly from infarct from PE  5 days therapy adequate

## 2020-06-12 NOTE — PROGRESS NOTES
Critical Care Progress Note    Date of admission  6/11/2020    Chief Complaint  79 y.o. female admitted 6/11/2020 with shortness of breath.  Imaging revealed evidence of pulmonary embolism with right heart strain.    Hospital Course   6/12 -    alteplase today.  Keep in ICU.  Follow alteplase with heparin on the post fibrinolytic protocol.      Interval Problem Update  Reviewed last 24 hour events:      Alteplase infusing  3 L NC  SR  T-max 98.2  +1100 mL since admit  Lactic acidosis resolved\  No pneumonia clinically    1800 hours:     Informed by Priscilla Lyons that this lady's recent breast biopsy revealed:  Invasive lobular carcinoma of the left breast - [facility]  I informed this lady of her diagnosis and we will consult oncology on the [place]      Review of Systems  Review of Systems   Constitutional: Negative for chills, diaphoresis and fever.   HENT: Negative for ear discharge, nosebleeds and tinnitus.    Eyes: Negative for pain, discharge and redness.   Respiratory: Positive for cough, sputum production and shortness of breath. Negative for hemoptysis.    Cardiovascular: Positive for chest pain and leg swelling.   Gastrointestinal: Negative for abdominal pain, nausea and vomiting.   Genitourinary: Negative for dysuria, hematuria and urgency.   Musculoskeletal: Negative for myalgias and neck pain.   Skin: Negative for rash.   Neurological: Negative for focal weakness, seizures and headaches.   Endo/Heme/Allergies: Does not bruise/bleed easily.   Psychiatric/Behavioral: Negative for hallucinations and suicidal ideas. The patient is not nervous/anxious.         Vital Signs for last 24 hours   Temp:  [35.8 °C (96.5 °F)-36.8 °C (98.2 °F)] 36.4 °C (97.6 °F)  Pulse:  [59-89] 62  Resp:  [16-41] 27  BP: ()/(44-81) 111/81  SpO2:  [95 %-99 %] 99 %    Hemodynamic parameters for last 24 hours       Respiratory Information for the last 24 hours       Physical Exam   Physical Exam  Constitutional:        Appearance: She is not diaphoretic.   HENT:      Head: Normocephalic and atraumatic.      Right Ear: External ear normal.      Left Ear: External ear normal.      Nose: Nose normal.      Mouth/Throat:      Mouth: Mucous membranes are moist.      Pharynx: Oropharynx is clear.   Eyes:      Extraocular Movements: Extraocular movements intact.      Conjunctiva/sclera: Conjunctivae normal.      Pupils: Pupils are equal, round, and reactive to light.   Neck:      Musculoskeletal: Normal range of motion and neck supple.   Cardiovascular:      Pulses: Normal pulses.      Heart sounds: No murmur. No friction rub.      Comments: Sinus rhythm  Pulmonary:      Breath sounds: No wheezing or rales.   Abdominal:      General: Bowel sounds are normal. There is no distension.      Palpations: Abdomen is soft.      Tenderness: There is no abdominal tenderness.   Musculoskeletal: Normal range of motion.      Right lower leg: No edema.      Left lower leg: No edema.      Comments: No clubbing or cyanosis   Skin:     General: Skin is warm and dry.      Capillary Refill: Capillary refill takes less than 2 seconds.   Neurological:      General: No focal deficit present.      Mental Status: She is oriented to person, place, and time.      Cranial Nerves: No cranial nerve deficit.      Comments: No focal weakness         Medications  Current Facility-Administered Medications   Medication Dose Route Frequency Provider Last Rate Last Dose   • heparin injection 3,700 Units  3,700 Units Intravenous PRN Krishna Edgar M.D.   3,700 Units at 06/12/20 1149    And   • heparin infusion 25,000 units in 500 mL 0.45% NACL   Intravenous Continuous Krishna Edgar M.D. 15 mL/hr at 06/12/20 1150 750 Units/hr at 06/12/20 1150   • MD Alert...ICU Electrolyte Replacement per Pharmacy   Other PHARMACY TO DOSE Mo Hawley M.D.       • senna-docusate (PERICOLACE or SENOKOT S) 8.6-50 MG per tablet 2 Tab  2 Tab Oral BID Can Rubalcava M.D.        And   •  polyethylene glycol/lytes (MIRALAX) PACKET 1 Packet  1 Packet Oral QDAY PRN Can Rubalcava M.D.        And   • magnesium hydroxide (MILK OF MAGNESIA) suspension 30 mL  30 mL Oral QDAY PRN Can Rubalcava M.D.        And   • bisacodyl (DULCOLAX) suppository 10 mg  10 mg Rectal QDAY PRN Can Rubalcava M.D.       • Respiratory Therapy Consult   Nebulization Continuous RT Can Rubalcava M.D.       • albuterol inhaler 2 Puff  2 Puff Inhalation Q6HRS PRN Can Rubalcava M.D.       • levothyroxine (SYNTHROID) tablet 125 mcg  125 mcg Oral AM ES Can Rubalcava M.D.   125 mcg at 06/12/20 0554   • NS infusion   Intravenous Continuous Mo Hawley M.D. 50 mL/hr at 06/12/20 1501         Fluids    Intake/Output Summary (Last 24 hours) at 6/12/2020 1810  Last data filed at 6/12/2020 1800  Gross per 24 hour   Intake 2436.35 ml   Output --   Net 2436.35 ml       Laboratory          Recent Labs     06/11/20  1503 06/12/20  0037 06/12/20  0248   SODIUM 139 138  --    POTASSIUM 5.2 4.6  --    CHLORIDE 102 106  --    CO2 16* 18*  --    BUN 35* 31*  --    CREATININE 1.45* 1.13  --    MAGNESIUM 2.4  --  2.2   CALCIUM 8.9 8.4*  --      Recent Labs     06/11/20  1503 06/12/20  0037   ALTSGPT 26 20   ASTSGOT 36 24   ALKPHOSPHAT 81 62   TBILIRUBIN 0.8 0.3   GLUCOSE 127* 113*     Recent Labs     06/11/20  1503 06/12/20  0037   WBC 12.2* 10.3   NEUTSPOLYS 72.80* 78.30*   LYMPHOCYTES 15.30* 10.80*   MONOCYTES 10.40 9.00   EOSINOPHILS 0.50 0.90   BASOPHILS 0.50 0.50   ASTSGOT 36 24   ALTSGPT 26 20   ALKPHOSPHAT 81 62   TBILIRUBIN 0.8 0.3     Recent Labs     06/11/20  1503 06/12/20  0037 06/12/20  0248 06/12/20  0634   RBC 4.53 3.87*  --   --    HEMOGLOBIN 14.2 12.0  --   --    HEMATOCRIT 43.7 37.9  --   --    PLATELETCT 175 139*  --   --    PROTHROMBTM  --   --  16.9*  --    APTT  --   --   --  >240.0*   INR  --   --  1.34*  --        Imaging  CT:    CTA personally reviewed.  She has bilateral pulmonary emboli.    Assessment/Plan  * Acute  pulmonary embolism with acute cor pulmonale (HCC)- (present on admission)  Assessment & Plan  Acute submassive pulmonary embolism  Associated right heart strain and pulmonary hypertension  S/P 50 mg of alteplase  Continue heparin on the post fibrinolytic protocol    Malignant neoplasm of left breast (HCC)  Assessment & Plan  Biopsy done at [facility] a few days ago reveals invasive lobular carcinoma of the left breast  Oncology evaluation    Pulmonary hypertension (HCC)  Assessment & Plan  RVSP 60 mmHg    ARLET (acute kidney injury) (HCC)- (present on admission)  Assessment & Plan  Improved with hydration    Essential hypertension- (present on admission)  Assessment & Plan  Hold antihypertensives    Elevated troponin- (present on admission)  Assessment & Plan  From right heart strain and acute PE       VTE:  Heparin  Ulcer: Not Indicated  Lines: None    I have performed a physical exam and reviewed and updated ROS and Plan today (6/12/2020). In review of yesterday's note (6/11/2020), there are no changes except as documented above.     This lady is critically ill in the ICU with an acute submassive pulmonary embolism.  She has received alteplase.  I have assessed and reassessed her cardiovascular status, blood pressure, hemodynamics and respiratory status.  She is at increased risk for worsening respiratory and cardiovascular system dysfunction.    Discussed patient condition and risk of morbidity and/or mortality with RN, RT, Pharmacy, [facility] resident, Charge nurse / hot rounds and QA team     The patient remains critically ill.  Critical care time = 30 minutes in directly providing and coordinating critical care and extensive data review.  No time overlap and excludes procedures.    The time spent is in addition to the time spent by Dr. Edgar earlier today.    Mo Hawley MD  Pulmonary and Critical Care Medicine

## 2020-06-12 NOTE — SENIOR ADMIT NOTE
Senior Admit Note    79-year-old woman with past medical history of hypertension, hypothyroidism presented with progressive exertional shortness of breath for past 6 days.  Chronic nonproductive cough, which is unchanged, otherwise no other ill symptoms, sick contacts, travel, recent surgery, immobility, past history of blood clots.  No past diagnosis of asthma or COPD, states was prescribed albuterol as needed several years ago, which she uses rarely, but uses no other inhalers.  Is past smoker though - smoked 2 ppd for over 20 years, quit in 1970s.  No cardiac history.  Right lower extremity has been chronically swollen since injury 1 year ago, unclear etiology.  Possibly etiology of current symptoms secondary to viral vs. Bacterial pneumonia superimposed upon undiagnosed COPD, but no strong convincing ill symptoms; mild leukocytosis but negative procalcitonin, lactic acid elevated but likely more reflective of hypoxia rather than sepsis given nontoxic appearance and stable vitals, chest X-ray showed some mild haziness of left costophrenic angle that may correspond to pleural fluid but otherwise no significant consolidation or infiltrates.  Negative COVID-19 test.  Abrupt onset of symptoms and significant oxygen requirement (3-4L O2 in ED after multiple nebulizers, baseline no oxygen requirement) also concerning for pulmonary embolism - right lower extremity significantly more edematous than left, unclear past work-up.  Per patient, negative mammogram last week (no past abnormal ones), negative colonoscopy 2 years ago (no past abnormal ones), all negative Pap smears in the past, no personal history of cancer.  Troponin also noted to be elevated, but no clinical or EKG correlate, more likely reflective of increased demand in setting of hypoxia.  Monitor with telemetry, pulse oximetry.  Will continue with ceftriaxone, switch to doxycycline (given QTc 484), start prednisone, Symbicort, Duonebs, incentive spirometry,  supplemental oxygen as needed.  Will need outpatient pulmonary function testing.  Will also obtain D-dimer first - renal function appears significantly impaired, would need to be prudent in considering contrast study.  Check BNP, trend troponin, EKG, lactic.  Obtain echo.  Avoid nephrotoxins - unclear baseline for renal function, possibly chronic kidney disease - hold lisinopril for now until can ascertain chronicity.     Addendum: D-dimer returned markedly elevated at 17.03.  Dr. Rubalcava discussed with patient need for further imaging with contrast to assess for pulmonary embolism, possibility of contrast injury to kidneys given existing impaired renal function.  Patient verbalized understanding and consented to proceed with CT-CTA chest pulmonary artery.  Will request stat imaging, as well as continue with IV fluids to assist with renal perfusion.    CT-CTA chest pulmonary artery showed extensive bilateral pulmonary emboli involving the main pulmonary arteries, bilateral lower lobe, right middle lobe, and lingular segmental and subsegmental branches, with evidence of right ventricular strain, dilation to 4.8 cm in diameter per discussion with radiology.  Per radiology, EKOS would need to be discussed first with intensivist.  Discussed with intensivist Dr. Edgar, who requested stat echo first, start heparin drip in interim.  Echo showed abnormal septal motion consistent with right ventricular volume overload, elevated right ventricular end-diastolic pressure.  Dr. Edgar requested IR consult for EKOS in daytime - will contact IR in daytime and arrange for EKOS, planned transfer to ICU for monitoring afterwards.  NPO in interim.  Will discontinue antibiotics, prednisone, and breathing treatments as suspect symptoms all due to acute pulmonary embolism, no evidence for infection or asthma / COPD exacerbation.

## 2020-06-12 NOTE — PROGRESS NOTES
Handoff report received. Assumed patient care. Patient resting in bed.  Patient not in distress, AAOX 4. Pt will be transferring to ICU. Will update family.  Safety precautions in place. Call light and personal belongings within reach. Educated to call for assistance if needed.

## 2020-06-12 NOTE — ASSESSMENT & PLAN NOTE
Elevated troponin on presentation   Asymptomatic     PLAN   - repeat trop remained same. No chest pain or EKG changes.   - ECHO was unremarkable with EF 60%. Probably from the PE and right heart strain

## 2020-06-12 NOTE — H&P
History & Physical Note    Date of Admission: 6/11/2020  Admission Status: Inpatient  [facility] Team: [facility] IM Red Team   Attending: No att. providers found   Senior Resident: Dr. Ayon   Intern: Dr. Rubalcava   Contact Number: [phone]    Chief Complaint: Dyspnea on exertion     History of Present Illness (HPI):     Sheree is a 79 y.o. female who presented on  6/11/2020 for evaluation of dyspnea on exertion , onset 5 days ago.Ongoing medical conditions include Hyperthyroidism S/p thyroid resection, currently on Levothyroxine and  Hypertension.    Patient reports that since last Saturday she developed sudden onset of shortness of breath.  She feels short of breath only on exertion and this has been progressively worsened.  She also reports mild unproductive cough but this has been going on for years.  She denies any fever, chills, preceding viral illness, contact with sick patients, or COVID-19 positive patients.  Denies any history of heart disease orthopnea or  PND.  Denies any chest pain or history of DVT but has chronic right leg swelling onset 1 year ago for which she is on Lasix 20 mg daily.     Of note she is on albuterol, Budesonide  and ipratropium but states that she was never diagnosed with asthma/COPD.  No PFTs were obtained in the past.  She does have remote history of smoking.    In the ED she was afebrile, tachypneic, heart rate in the 80s, saturating n high 80s on room air requiring up to 3 L oxygen.  Blood work revealed leukocytosis with left shift, mild ARLET with BUN 35, creatinine 1.45, GFR 35, anion gap metabolic acidosis, lactic acid 4.4 which trended down to 4.1, troponin elevated at 73, procalcitonin normal, blood culture pending.  Chest x-ray concerning for left lower lobe pneumonia.  She received 2 L bolus, ceftriaxone and azithromycin in the ED.     review of Systems: Review of Systems   Constitutional: Positive for malaise/fatigue. Negative for chills and fever.   HENT: Negative for ear discharge and  ear pain.    Eyes: Negative for blurred vision and double vision.   Respiratory: Positive for cough, shortness of breath and wheezing. Negative for hemoptysis and sputum production.    Cardiovascular: Positive for leg swelling. Negative for chest pain, palpitations and orthopnea.   Gastrointestinal: Negative for abdominal pain, constipation, diarrhea, nausea and vomiting.   Genitourinary: Negative for dysuria, frequency and urgency.   Musculoskeletal: Negative for myalgias.   Neurological: Negative for tremors, sensory change, speech change and focal weakness.       Past Medical History:   Past Medical History was reviewed with patient.   has a past medical history of Asthma and Hypertension.    Past Surgical History: Past Surgical History was reviewed with patient.   has a past surgical history that includes thyroidectomy; appendectomy; and hysterectomy radical.    Medications: Medications have been reviewed with patient.  Prior to Admission Medications   Prescriptions Last Dose Informant Patient Reported? Taking?   DILTIAZem (CARDIZEM) 120 MG Tab   Yes Yes   Sig: Take 240 mg by mouth 3 times a day.   NS SOLN 60 mL with albuterol 2.5 mg/0.5 mL NEBU 100 mg   Yes Yes   Sig: by Nebulization route.   albuterol 108 (90 Base) MCG/ACT Aero Soln inhalation aerosol   Yes Yes   Sig: Inhale 2 Puffs by mouth every 6 hours as needed for Shortness of Breath.   budesonide (PULMICORT) 0.25 MG/2ML Suspension   Yes Yes   Si mcg by Nebulization route 2 times a day.   cyanocobalamin (VITAMIN B-12) 500 MCG Tab   Yes Yes   Sig: Take 5,000 mcg by mouth every day.   furosemide (LASIX) 20 MG Tab   Yes Yes   Sig: Take 20 mg by mouth every day.   ipratropium (ATROVENT) 0.02 % Solution   Yes Yes   Si.2 mg by Nebulization route.   iron dextran complex (INFED) 50 MG/ML Solution   Yes Yes   Si mg every day.   levothyroxine (SYNTHROID) 125 MCG Tab   Yes Yes   Sig: Take 125 mcg by mouth Every morning on an empty stomach.    lisinopril (PRINIVIL) 20 MG Tab   Yes Yes   Sig: Take 20 mg by mouth every day.   magnesium hydroxide (MILK OF MAGNESIA) 400 MG/5ML Suspension   Yes Yes   Sig: Take 30 mL by mouth 1 time daily as needed.   magnesium oxide (MAG-OX) 400 MG Tab tablet   Yes Yes   Sig: Take 400 mg by mouth every day.   potassium chloride (KLOR-CON) 20 MEQ Pack   Yes Yes   Sig: Take 20 mEq by mouth 2 times a day.      Facility-Administered Medications: None        Allergies: Allergies have been reviewed with patient.  No Known Allergies    Family History: Father, sister both had lung cancer                              Mother, sister both had colon cancer   family history is not on file.     Social History:   Tobacco: smoked 2 PPD since 13 years of age, Quit in 1970   Alcohol: No   Recreational drugs (illegal and prescription):  No   Employment: Retired in October 2019, worked as  in [employer]   Activity Level:  No difficulty at baseline    Living situation:  Lives with  in [facility]   Recent travel:  None   Primary Care Provider: reviewed Priscilla Lyons M.D.  Other (stressors, spirituality, exposures): None       Physical Exam: Vitals:  Temp:  [35.8 °C (96.5 °F)-36.7 °C (98.1 °F)] 35.8 °C (96.5 °F)  Pulse:  [79-90] 79  Resp:  [16-21] 16  BP: ()/(56-70) 107/70  SpO2:  [91 %-100 %] 97 %    Physical Exam  Constitutional:       General: She is not in acute distress.     Appearance: Normal appearance.   HENT:      Head: Normocephalic and atraumatic.      Nose: Nose normal.      Mouth/Throat:      Mouth: Mucous membranes are moist.   Eyes:      Extraocular Movements: Extraocular movements intact.      Pupils: Pupils are equal, round, and reactive to light.   Neck:      Musculoskeletal: Normal range of motion and neck supple.   Cardiovascular:      Rate and Rhythm: Normal rate and regular rhythm.      Pulses: Normal pulses.      Heart sounds: Normal heart sounds. No murmur. No friction rub.   Pulmonary:       Effort: Pulmonary effort is normal. No respiratory distress.      Breath sounds: Normal breath sounds. No wheezing or rhonchi.   Abdominal:      General: Abdomen is flat. There is no distension.      Palpations: Abdomen is soft.      Tenderness: There is no abdominal tenderness.   Musculoskeletal:      Right lower leg: Edema present.   Skin:     General: Skin is warm and dry.   Neurological:      General: No focal deficit present.      Mental Status: She is alert and oriented to person, place, and time.      Cranial Nerves: No cranial nerve deficit.      Motor: No weakness.   Psychiatric:         Mood and Affect: Mood normal.         Labs:   Lab Results   Component Value Date/Time    WBC 12.2 (H) 06/11/2020 03:03 PM    RBC 4.53 06/11/2020 03:03 PM    HEMOGLOBIN 14.2 06/11/2020 03:03 PM    HEMATOCRIT 43.7 06/11/2020 03:03 PM    MCV 96.5 06/11/2020 03:03 PM    MCH 31.3 06/11/2020 03:03 PM    MCHC 32.5 (L) 06/11/2020 03:03 PM    MPV 10.3 06/11/2020 03:03 PM    NEUTSPOLYS 72.80 (H) 06/11/2020 03:03 PM    LYMPHOCYTES 15.30 (L) 06/11/2020 03:03 PM    MONOCYTES 10.40 06/11/2020 03:03 PM    EOSINOPHILS 0.50 06/11/2020 03:03 PM    BASOPHILS 0.50 06/11/2020 03:03 PM      Lab Results   Component Value Date/Time    SODIUM 139 06/11/2020 03:03 PM    POTASSIUM 5.2 06/11/2020 03:03 PM    CHLORIDE 102 06/11/2020 03:03 PM    CO2 16 (L) 06/11/2020 03:03 PM    GLUCOSE 127 (H) 06/11/2020 03:03 PM    BUN 35 (H) 06/11/2020 03:03 PM    CREATININE 1.45 (H) 06/11/2020 03:03 PM        EKG: Per my read,     Imaging:   DX-CHEST-PORTABLE (1 VIEW)   Final Result      Likely layering pleural fluid especially on the left      Moderate cardiac silhouette enlargement with mild aortic ectasia      EC-ECHOCARDIOGRAM COMPLETE W/O CONT    (Results Pending)   US-EXTREMITY VENOUS LOWER UNILAT RIGHT    (Results Pending)       Previous Data Review: reviewed    Community acquired pneumonia  Assessment & Plan  Presenting with shortness of breath on exertion,  onset 5 days ago.  Denies any recent worsening cough, fever, chills, recent ill contacts or preceding viral symptoms.  On admission Sirs 2/4 with tachypnea and leukocytosis  Chest x-ray concerning for left lower lobe pneumonia, procalcitonin normal, COVID-19 negative    DDX include: Community-acquired pneumonia (in the setting of leukocytosis, chest x-ray findings) , COPD exacerbation (chronic smoking history), PE (right leg swelling though chronic and sudden onset shortness of breath) congestive heart failure but unlikely as patient has no signs of volume overload, denies orthopnea/PND.    Plan  -Admit to telemetry  -Continuous pulse ox  -IV fluids  -RT protocol  -Albuterol as needed  -Scheduled duo nebs  -Ceftriaxone and doxycycline  -Prednisone 40 mg daily for 5 days  -Ordered d-dimer as CT PE cannot be obtained in the setting of ARLET   -Right lower extremity USG   -Echo and BNP       ARLET (acute kidney injury) (HCC)  Assessment & Plan  Patient presents with mild ARLET, BUN 35, creatinine 1.45  Unknown baseline  Bun to creatinine ratio more than 20 indicating prerenal/postrenal etiology  Denies any urinary symptoms, likely prerenal secondary to dehydration in the setting of ongoing infection    Plan  -Maintenance IV fluids  -Calculate Fena urea as patient is on Lasix  -Hold Lasix and lisinopril  -Avoid nephrotoxic agents  -Renally dose medication  -Hold magnesium and potassium    Hypertension  Assessment & Plan  History of hypertension   On lisinopril and diltiazem ( states its for Hypertension) denies history of Atrial fibrillation   Continue diltiazem, hold lisinopril     Metabolic acidosis, increased anion gap (IAG)  Assessment & Plan  Anion gap metabolic acidosis in the setting of lactic acidosis   Lactic acid 4.4 -> 4.1   Received 2 litres of bolus in the ED     PLAN   - trend Lactic acid   - IV fluids       Elevated troponin  Assessment & Plan  Elevated troponin on presentation   Asymptomatic     PLAN   -  continue Tele   - trend trops , EKG x 1

## 2020-06-12 NOTE — CONSULTS
Critical Care Consultation    Date of consult: 6/12/2020    Referring Physician  Mily Ayon MD, R service    Reason for Consultation  Pulmonary embolism    History of Presenting Illness  79 y.o. female who presented 6/11/2020 with shortness of breath for 5 days.  pmh of hyperthyroidism.  She was diagnosed with submassive PE on CTA.  She has RV distension on ct imaging.  I ordered ECHO and there is right heart strain.  She is on oxygen 3 L nc, resting comfortably.  She is not on oxygen at home.  She has not had any recent surgeries and no history of stroke.  No chest pain.  SOB worse on exertion.  She is on lasix for chronic lower ext swelling.  She is on albuterol, symbicort and ipratropium at home but denies copd/asthma diagnosis.  She has a remote smoking history.  D dimer was elevated in er.  INR I ordered wnl.  Troponin elevated to 73.  Lactic acid to 4.4.  She is on ceftriaxone and azithromycin for possible pneumonia on ct imaging.      Code Status  DNAR, I OK    Review of Systems  Review of Systems   Constitutional: Positive for malaise/fatigue. Negative for chills, diaphoresis, fever and weight loss.   HENT: Negative for congestion and sinus pain.    Eyes: Negative for blurred vision, double vision and photophobia.   Respiratory: Positive for shortness of breath. Negative for cough, hemoptysis, sputum production, wheezing and stridor.    Cardiovascular: Positive for leg swelling. Negative for chest pain and palpitations.   Gastrointestinal: Negative for blood in stool, heartburn, melena and vomiting.   Genitourinary: Negative for dysuria and urgency.   Musculoskeletal: Negative for back pain, myalgias and neck pain.   Skin: Negative for itching and rash.   Neurological: Negative for dizziness, tingling, sensory change, speech change, focal weakness and headaches.   Endo/Heme/Allergies: Negative for polydipsia. Does not bruise/bleed easily.   Psychiatric/Behavioral: Negative for depression. The patient is  not nervous/anxious.        Past Medical History   has a past medical history of Asthma and Hypertension.    Surgical History   has a past surgical history that includes thyroidectomy; appendectomy; and hysterectomy radical.    Family History  family history is not on file.    Social History   reports that she quit smoking about 50 years ago. She has never used smokeless tobacco. She reports current alcohol use of about 8.4 oz of alcohol per week. She reports that she does not use drugs.    Medications  Home Medications     Reviewed by Angie Cummings (Pharmacy [facility]) on 06/11/20 at 1846  Med List Status: Unable to Obtain   Medication Last Dose Status   albuterol 108 (90 Base) MCG/ACT Aero Soln inhalation aerosol  Active   budesonide (PULMICORT) 0.25 MG/2ML Suspension  Active   cyanocobalamin (VITAMIN B-12) 500 MCG Tab  Active   DILTIAZem (CARDIZEM) 120 MG Tab  Active   furosemide (LASIX) 20 MG Tab  Active   ipratropium (ATROVENT) 0.02 % Solution  Active   iron dextran complex (INFED) 50 MG/ML Solution  Active   levothyroxine (SYNTHROID) 125 MCG Tab  Active   lisinopril (PRINIVIL) 20 MG Tab  Active   magnesium hydroxide (MILK OF MAGNESIA) 400 MG/5ML Suspension  Active   magnesium oxide (MAG-OX) 400 MG Tab tablet  Active   NS SOLN 60 mL with albuterol 2.5 mg/0.5 mL NEBU 100 mg  Active   potassium chloride (KLOR-CON) 20 MEQ Pack  Active              Current Facility-Administered Medications   Medication Dose Route Frequency Provider Last Rate Last Dose   • senna-docusate (PERICOLACE or SENOKOT S) 8.6-50 MG per tablet 2 Tab  2 Tab Oral BID Can Rubalcava M.D.        And   • polyethylene glycol/lytes (MIRALAX) PACKET 1 Packet  1 Packet Oral QDAY PRN Can Rubalcava M.D.        And   • magnesium hydroxide (MILK OF MAGNESIA) suspension 30 mL  30 mL Oral QDAY PRN Can Rubalcava M.D.        And   • bisacodyl (DULCOLAX) suppository 10 mg  10 mg Rectal QDAY PRN Can Rubalcava M.D.       • Respiratory Therapy Consult    Nebulization Continuous RT Can Rubalcava M.D.       • cefTRIAXone (ROCEPHIN) 1 g in  mL IVPB  1 g Intravenous Q24HRS Can Rubalcava M.D.       • albuterol inhaler 2 Puff  2 Puff Inhalation Q6HRS PRN Can Rubalcava M.D.       • DILTIAZem (CARDIZEM) tablet 240 mg  240 mg Oral TID Can Rubalcava M.D.   Stopped at 06/11/20 2030   • levothyroxine (SYNTHROID) tablet 125 mcg  125 mcg Oral AM ES Can Rubalcava M.D.       • doxycycline monohydrate (ADOXA) tablet 100 mg  100 mg Oral Q12HRS Mily Ayon M.D.       • ipratropium-albuterol (DUONEB) nebulizer solution  3 mL Nebulization Q6HRS (RT) Mily Ayon M.D.   3 mL at 06/11/20 2036   • predniSONE (DELTASONE) tablet 40 mg  40 mg Oral DAILY Mily Ayon M.D.   40 mg at 06/11/20 2212   • budesonide-formoterol (SYMBICORT) 160-4.5 MCG/ACT inhaler 2 Puff  2 Puff Inhalation BID (RT) Mily Ayon M.D.       • NS infusion   Intravenous Continuous Mily Ayon M.D. 100 mL/hr at 06/11/20 2258         Allergies  No Known Allergies    Vital Signs last 24 hours  Temp:  [35.8 °C (96.5 °F)-36.7 °C (98.1 °F)] 36 °C (96.8 °F)  Pulse:  [77-90] 77  Resp:  [16-21] 18  BP: ()/(55-70) 99/55  SpO2:  [91 %-100 %] 97 %    Physical Exam  Physical Exam  Vitals signs and nursing note reviewed.   Constitutional:       General: She is not in acute distress.     Appearance: She is not ill-appearing, toxic-appearing or diaphoretic.   HENT:      Head: Normocephalic and atraumatic.      Right Ear: External ear normal.      Left Ear: External ear normal.      Nose: No congestion or rhinorrhea.      Mouth/Throat:      Mouth: Mucous membranes are dry.      Pharynx: No oropharyngeal exudate or posterior oropharyngeal erythema.   Eyes:      General: No scleral icterus.     Extraocular Movements: Extraocular movements intact.      Conjunctiva/sclera: Conjunctivae normal.      Pupils: Pupils are equal, round, and reactive to light.   Neck:      Musculoskeletal: Neck supple. No neck rigidity or  muscular tenderness.   Cardiovascular:      Rate and Rhythm: Regular rhythm. Tachycardia present.      Pulses: Normal pulses.      Heart sounds: Normal heart sounds. No murmur.   Pulmonary:      Effort: Respiratory distress present.      Breath sounds: No wheezing.   Abdominal:      General: There is no distension.      Palpations: There is no mass.      Tenderness: There is no abdominal tenderness. There is no guarding.   Musculoskeletal:         General: No swelling or tenderness.      Right lower leg: Edema present.      Left lower leg: Edema present.      Comments: Swelling not significant   Lymphadenopathy:      Cervical: No cervical adenopathy.   Skin:     Coloration: Skin is not jaundiced or pale.      Findings: No bruising, erythema, lesion or rash.   Neurological:      General: No focal deficit present.      Mental Status: She is alert and oriented to person, place, and time.      Cranial Nerves: No cranial nerve deficit.      Sensory: No sensory deficit.      Motor: No weakness.      Coordination: Coordination normal.      Deep Tendon Reflexes: Reflexes normal.   Psychiatric:         Mood and Affect: Mood normal.         Behavior: Behavior normal.         Fluids    Intake/Output Summary (Last 24 hours) at 6/12/2020 0012  Last data filed at 6/11/2020 1833  Gross per 24 hour   Intake 1100 ml   Output --   Net 1100 ml       Laboratory  Recent Results (from the past 48 hour(s))   LACTIC ACID    Collection Time: 06/11/20  3:03 PM   Result Value Ref Range    Lactic Acid 4.4 (HH) 0.5 - 2.0 mmol/L   CBC WITH DIFFERENTIAL    Collection Time: 06/11/20  3:03 PM   Result Value Ref Range    WBC 12.2 (H) 4.8 - 10.8 K/uL    RBC 4.53 4.20 - 5.40 M/uL    Hemoglobin 14.2 12.0 - 16.0 g/dL    Hematocrit 43.7 37.0 - 47.0 %    MCV 96.5 81.4 - 97.8 fL    MCH 31.3 27.0 - 33.0 pg    MCHC 32.5 (L) 33.6 - 35.0 g/dL    RDW 50.6 (H) 35.9 - 50.0 fL    Platelet Count 175 164 - 446 K/uL    MPV 10.3 9.0 - 12.9 fL    Neutrophils-Polys  72.80 (H) 44.00 - 72.00 %    Lymphocytes 15.30 (L) 22.00 - 41.00 %    Monocytes 10.40 0.00 - 13.40 %    Eosinophils 0.50 0.00 - 6.90 %    Basophils 0.50 0.00 - 1.80 %    Immature Granulocytes 0.50 0.00 - 0.90 %    Nucleated RBC 0.00 /100 WBC    Neutrophils (Absolute) 8.88 (H) 2.00 - 7.15 K/uL    Lymphs (Absolute) 1.87 1.00 - 4.80 K/uL    Monos (Absolute) 1.27 (H) 0.00 - 0.85 K/uL    Eos (Absolute) 0.06 0.00 - 0.51 K/uL    Baso (Absolute) 0.06 0.00 - 0.12 K/uL    Immature Granulocytes (abs) 0.06 0.00 - 0.11 K/uL    NRBC (Absolute) 0.00 K/uL   Comp Metabolic Panel    Collection Time: 06/11/20  3:03 PM   Result Value Ref Range    Sodium 139 135 - 145 mmol/L    Potassium 5.2 3.6 - 5.5 mmol/L    Chloride 102 96 - 112 mmol/L    Co2 16 (L) 20 - 33 mmol/L    Anion Gap 21.0 (H) 7.0 - 16.0    Glucose 127 (H) 65 - 99 mg/dL    Bun 35 (H) 8 - 22 mg/dL    Creatinine 1.45 (H) 0.50 - 1.40 mg/dL    Calcium 8.9 8.5 - 10.5 mg/dL    AST(SGOT) 36 12 - 45 U/L    ALT(SGPT) 26 2 - 50 U/L    Alkaline Phosphatase 81 30 - 99 U/L    Total Bilirubin 0.8 0.1 - 1.5 mg/dL    Albumin 3.6 3.2 - 4.9 g/dL    Total Protein 7.0 6.0 - 8.2 g/dL    Globulin 3.4 1.9 - 3.5 g/dL    A-G Ratio 1.1 g/dL   TROPONIN    Collection Time: 06/11/20  3:03 PM   Result Value Ref Range    Troponin T 73 (H) 6 - 19 ng/L   PROCALCITONIN    Collection Time: 06/11/20  3:03 PM   Result Value Ref Range    Procalcitonin <0.05 <0.25 ng/mL   ESTIMATED GFR    Collection Time: 06/11/20  3:03 PM   Result Value Ref Range    GFR If  42 (A) >60 mL/min/1.73 m 2    GFR If Non African American 35 (A) >60 mL/min/1.73 m 2   D-DIMER    Collection Time: 06/11/20  3:03 PM   Result Value Ref Range    D-Dimer Screen 17.03 (H) 0.00 - 0.50 ug/mL (FEU)   TROPONIN    Collection Time: 06/11/20  3:03 PM   Result Value Ref Range    Troponin T 72 (H) 6 - 19 ng/L   MAGNESIUM    Collection Time: 06/11/20  3:03 PM   Result Value Ref Range    Magnesium 2.4 1.5 - 2.5 mg/dL   EKG    Collection  Time: 20  3:09 PM   Result Value Ref Range    Report       [facility] Emergency Dept.    Test Date:  2020  Pt Name:    MICHAEL MATAMOROS                Department: ER  MRN:        [id number]                      Room:       RD 11  Gender:     Female                       Technician: [id number]  :        [id number]                   Requested By:ER TRIAGE PROTOCOL  Order #:    [id number]                    Reading MD:    Measurements  Intervals                                Axis  Rate:       88                           P:          69  FL:         144                          QRS:        -68  QRSD:       80                           T:          -5  QT:         400  QTc:        484    Interpretive Statements  SINUS RHYTHM  LEFT ANTERIOR FASCICULAR BLOCK  BORDERLINE LOW VOLTAGE IN FRONTAL LEADS  BORDERLINE R WAVE PROGRESSION, ANTERIOR LEADS  NONSPECIFIC T ABNORMALITIES, ANTERIOR LEADS  No previous ECG available for comparison     COVID/SARS CoV-2    Collection Time: 20  3:56 PM    Specimen: Nasopharyngeal; Respirate   Result Value Ref Range    COVID Order Status Received    SARS-CoV-2, PCR (In-House)    Collection Time: 20  3:56 PM   Result Value Ref Range    SARS-CoV-2 Source NP Swab     SARS-CoV-2 by PCR NotDetected NotDetected   LACTIC ACID    Collection Time: 20  5:11 PM   Result Value Ref Range    Lactic Acid 4.1 (HH) 0.5 - 2.0 mmol/L   EKG    Collection Time: 20  9:22 PM   Result Value Ref Range    Report       [facility]    Test Date:  2020  Pt Name:    MICHAEL MATAMOROS                Department: 171  MRN:        [id number]                      Room:       14  Gender:     Female                       Technician: [id number]  :        1940                   Requested By:MAXWELL VILLARREAL  Order #:    [id number]                    Reading MD: Shin Hernandez MD    Measurements  Intervals                                Axis  Rate:       76                            P:          59  VA:         152                          QRS:        -74  QRSD:       82                           T:          -48  QT:         456  QTc:        513    Interpretive Statements  SINUS RHYTHM  LEFT ANTERIOR FASCICULAR BLOCK  LOW VOLTAGE IN FRONTAL LEADS  BORDERLINE R WAVE PROGRESSION, ANTERIOR LEADS  ABNORMAL T, CONSIDER ISCHEMIA, DIFFUSE LEADS  PROLONGED QT INTERVAL  Compared to ECG 06/11/2020 15:09:37  Possible ischemia now present  Prolonged QT interval now present  T-wave abnormality still present  Electro nically Signed On 6- 0:11:54 PDT by Shin Hernandez MD         Imaging  EC-ECHOCARDIOGRAM COMPLETE W/O CONT   Final Result      CT-CTA CHEST PULMONARY ARTERY W/ RECONS   Final Result         1.  Extensive bilateral pulmonary emboli involving the main pulmonary arteries, bilateral lower lobe, right middle lobe, and lingular segmental and subsegmental branches.   2.  Appearance of heart suggests component of ventricular strain   3.  Small left pleural effusion   4.  Hepatomegaly   5.  Atherosclerosis and atherosclerotic coronary artery disease.      These findings were discussed with the patient's clinician, Dr. Rubalcava, on 6/11/2020 11:05 PM.      US-EXTREMITY VENOUS LOWER UNILAT RIGHT   Final Result      DX-CHEST-PORTABLE (1 VIEW)   Final Result      Likely layering pleural fluid especially on the left      Moderate cardiac silhouette enlargement with mild aortic ectasia      IR-CONSULT AND TREAT    (Results Pending)       Assessment/Plan  PE (pulmonary thromboembolism) (HCC)  Assessment & Plan  PE with cor pulmonale  Right heart strain on ct  Right heart strain on echo  Recommend ekos given moderate high risk PE with hemodynamic stability but underlying cardiac stress  BNP and troponin elevated  On oxygen, no oxygen outpt  Planned to consult IR  EKOS preferred over systemic therapy in this patient initially given hemodynamic stability  However more hypotensive, transfer to ICU and  give 50 mg alteplase  Heparin drip    Community acquired pneumonia  Assessment & Plan  On ceftriaxone and azithromycin  Ct changes possibly from infarct from PE  5 days therapy adequate    ARLET (acute kidney injury) (HCC)  Assessment & Plan  cta with contrast  Gentle fluids    Elevated brain natriuretic peptide (BNP) level  Assessment & Plan  Secondary to right heart strain    Positive D dimer  Assessment & Plan  PE on cta    Hypertension  Assessment & Plan  On lasix and lisinopril and cardizem outpt  Continued on cardizem    Metabolic acidosis, increased anion gap (IAG)  Assessment & Plan  Secondary to PE, possible pneumonia per ct imaging  On antibiotics  Gentle fluids  Heparin drip      Elevated troponin  Assessment & Plan  From right heart strain from pe  Trend until downtrends  ECHO with right heart strain      Discussed patient condition and risk of morbidity and/or mortality with RN, Pharmacy, [employer] [facility] resident, Code status disscussed, Charge nurse / hot rounds, Patient and cardiology     Patient is critically ill. Pulmonary embolism, submassive.  On heparin drip, adjusting per sequential PTT.  TPA, will need close neurological monitoring post TPA with neuro checks q 15 min, then q 30 min then q 1 hour.  There is a high risk for clinical deterioration due to right heart strain, levophed for map > 65 and sbp > 90 and high risk for bleeding post TPA.  If untreated there is a high chance of deterioration and eventually death.  The critical that has been undertaken is medically complex. There has been no overlap in critical care time. Critical Care Time not including procedures: 60 minutes

## 2020-06-13 PROBLEM — N17.9 AKI (ACUTE KIDNEY INJURY) (HCC): Status: RESOLVED | Noted: 2020-06-11 | Resolved: 2020-06-13

## 2020-06-13 LAB
ANION GAP SERPL CALC-SCNC: 10 MMOL/L (ref 7–16)
APTT PPP: 51.2 SEC (ref 24.7–36)
APTT PPP: 81.3 SEC (ref 24.7–36)
APTT PPP: 82.1 SEC (ref 24.7–36)
BASOPHILS # BLD AUTO: 0.4 % (ref 0–1.8)
BASOPHILS # BLD: 0.04 K/UL (ref 0–0.12)
BUN SERPL-MCNC: 34 MG/DL (ref 8–22)
CALCIUM SERPL-MCNC: 7.9 MG/DL (ref 8.5–10.5)
CHLORIDE SERPL-SCNC: 107 MMOL/L (ref 96–112)
CO2 SERPL-SCNC: 20 MMOL/L (ref 20–33)
CREAT SERPL-MCNC: 1.01 MG/DL (ref 0.5–1.4)
EKG IMPRESSION: NORMAL
EOSINOPHIL # BLD AUTO: 0.02 K/UL (ref 0–0.51)
EOSINOPHIL NFR BLD: 0.2 % (ref 0–6.9)
ERYTHROCYTE [DISTWIDTH] IN BLOOD BY AUTOMATED COUNT: 50.7 FL (ref 35.9–50)
ERYTHROCYTE [DISTWIDTH] IN BLOOD BY AUTOMATED COUNT: 50.8 FL (ref 35.9–50)
GLUCOSE SERPL-MCNC: 98 MG/DL (ref 65–99)
HCT VFR BLD AUTO: 32.7 % (ref 37–47)
HCT VFR BLD AUTO: 33.1 % (ref 37–47)
HGB BLD-MCNC: 10.4 G/DL (ref 12–16)
HGB BLD-MCNC: 10.5 G/DL (ref 12–16)
IMM GRANULOCYTES # BLD AUTO: 0.07 K/UL (ref 0–0.11)
IMM GRANULOCYTES NFR BLD AUTO: 0.7 % (ref 0–0.9)
LYMPHOCYTES # BLD AUTO: 1.62 K/UL (ref 1–4.8)
LYMPHOCYTES NFR BLD: 16.1 % (ref 22–41)
MAGNESIUM SERPL-MCNC: 2.3 MG/DL (ref 1.5–2.5)
MCH RBC QN AUTO: 31.2 PG (ref 27–33)
MCH RBC QN AUTO: 31.5 PG (ref 27–33)
MCHC RBC AUTO-ENTMCNC: 31.7 G/DL (ref 33.6–35)
MCHC RBC AUTO-ENTMCNC: 31.8 G/DL (ref 33.6–35)
MCV RBC AUTO: 98.2 FL (ref 81.4–97.8)
MCV RBC AUTO: 99.1 FL (ref 81.4–97.8)
MONOCYTES # BLD AUTO: 0.89 K/UL (ref 0–0.85)
MONOCYTES NFR BLD AUTO: 8.8 % (ref 0–13.4)
NEUTROPHILS # BLD AUTO: 7.43 K/UL (ref 2–7.15)
NEUTROPHILS NFR BLD: 73.8 % (ref 44–72)
NRBC # BLD AUTO: 0 K/UL
NRBC BLD-RTO: 0 /100 WBC
PLATELET # BLD AUTO: 171 K/UL (ref 164–446)
PLATELET # BLD AUTO: 191 K/UL (ref 164–446)
PMV BLD AUTO: 10 FL (ref 9–12.9)
PMV BLD AUTO: 10.3 FL (ref 9–12.9)
POTASSIUM SERPL-SCNC: 4.5 MMOL/L (ref 3.6–5.5)
RBC # BLD AUTO: 3.3 M/UL (ref 4.2–5.4)
RBC # BLD AUTO: 3.37 M/UL (ref 4.2–5.4)
SODIUM SERPL-SCNC: 137 MMOL/L (ref 135–145)
WBC # BLD AUTO: 10.1 K/UL (ref 4.8–10.8)
WBC # BLD AUTO: 10.2 K/UL (ref 4.8–10.8)

## 2020-06-13 PROCEDURE — 83735 ASSAY OF MAGNESIUM: CPT

## 2020-06-13 PROCEDURE — 700105 HCHG RX REV CODE 258: Performed by: INTERNAL MEDICINE

## 2020-06-13 PROCEDURE — 36415 COLL VENOUS BLD VENIPUNCTURE: CPT

## 2020-06-13 PROCEDURE — 85025 COMPLETE CBC W/AUTO DIFF WBC: CPT

## 2020-06-13 PROCEDURE — 93010 ELECTROCARDIOGRAM REPORT: CPT | Performed by: INTERNAL MEDICINE

## 2020-06-13 PROCEDURE — 93005 ELECTROCARDIOGRAM TRACING: CPT | Performed by: INTERNAL MEDICINE

## 2020-06-13 PROCEDURE — 85730 THROMBOPLASTIN TIME PARTIAL: CPT | Mod: 91

## 2020-06-13 PROCEDURE — 700101 HCHG RX REV CODE 250: Performed by: INTERNAL MEDICINE

## 2020-06-13 PROCEDURE — 80048 BASIC METABOLIC PNL TOTAL CA: CPT

## 2020-06-13 PROCEDURE — 770020 HCHG ROOM/CARE - TELE (206)

## 2020-06-13 PROCEDURE — 99233 SBSQ HOSP IP/OBS HIGH 50: CPT | Performed by: INTERNAL MEDICINE

## 2020-06-13 PROCEDURE — 85027 COMPLETE CBC AUTOMATED: CPT

## 2020-06-13 RX ORDER — MIDODRINE HYDROCHLORIDE 5 MG/1
10 TABLET ORAL
Status: DISCONTINUED | OUTPATIENT
Start: 2020-06-13 | End: 2020-06-13

## 2020-06-13 RX ORDER — NOREPINEPHRINE BITARTRATE 0.03 MG/ML
0-30 INJECTION, SOLUTION INTRAVENOUS CONTINUOUS
Status: DISCONTINUED | OUTPATIENT
Start: 2020-06-13 | End: 2020-06-13

## 2020-06-13 RX ORDER — DOPAMINE HYDROCHLORIDE 160 MG/100ML
0-20 INJECTION, SOLUTION INTRAVENOUS CONTINUOUS
Status: DISCONTINUED | OUTPATIENT
Start: 2020-06-13 | End: 2020-06-13

## 2020-06-13 RX ORDER — SODIUM CHLORIDE 9 MG/ML
1000 INJECTION, SOLUTION INTRAVENOUS ONCE
Status: COMPLETED | OUTPATIENT
Start: 2020-06-13 | End: 2020-06-13

## 2020-06-13 RX ORDER — MIDODRINE HYDROCHLORIDE 5 MG/1
5 TABLET ORAL
Status: DISCONTINUED | OUTPATIENT
Start: 2020-06-13 | End: 2020-06-16

## 2020-06-13 RX ORDER — SODIUM CHLORIDE 9 MG/ML
500 INJECTION, SOLUTION INTRAVENOUS ONCE
Status: COMPLETED | OUTPATIENT
Start: 2020-06-13 | End: 2020-06-13

## 2020-06-13 RX ADMIN — SODIUM CHLORIDE: 9 INJECTION, SOLUTION INTRAVENOUS at 23:41

## 2020-06-13 RX ADMIN — HEPARIN SODIUM 3700 UNITS: 1000 INJECTION, SOLUTION INTRAVENOUS; SUBCUTANEOUS at 19:32

## 2020-06-13 RX ADMIN — SODIUM CHLORIDE: 9 INJECTION, SOLUTION INTRAVENOUS at 03:05

## 2020-06-13 RX ADMIN — SODIUM CHLORIDE 500 ML: 9 INJECTION, SOLUTION INTRAVENOUS at 04:00

## 2020-06-13 RX ADMIN — NOREPINEPHRINE BITARTRATE 2 MCG/MIN: 1 INJECTION, SOLUTION, CONCENTRATE INTRAVENOUS at 03:05

## 2020-06-13 RX ADMIN — SODIUM CHLORIDE 1000 ML: 9 INJECTION, SOLUTION INTRAVENOUS at 01:45

## 2020-06-13 ASSESSMENT — LIFESTYLE VARIABLES
EVER HAD A DRINK FIRST THING IN THE MORNING TO STEADY YOUR NERVES TO GET RID OF A HANGOVER: NO
EVER FELT BAD OR GUILTY ABOUT YOUR DRINKING: NO
HAVE PEOPLE ANNOYED YOU BY CRITICIZING YOUR DRINKING: NO
ALCOHOL_USE: YES
HOW MANY TIMES IN THE PAST YEAR HAVE YOU HAD 5 OR MORE DRINKS IN A DAY: 0
TOTAL SCORE: 0
ON A TYPICAL DAY WHEN YOU DRINK ALCOHOL HOW MANY DRINKS DO YOU HAVE: 2
DOES PATIENT WANT TO STOP DRINKING: NO
TOTAL SCORE: 0
CONSUMPTION TOTAL: POSITIVE
HAVE YOU EVER FELT YOU SHOULD CUT DOWN ON YOUR DRINKING: NO
TOTAL SCORE: 0
AVERAGE NUMBER OF DAYS PER WEEK YOU HAVE A DRINK CONTAINING ALCOHOL: 7

## 2020-06-13 ASSESSMENT — COGNITIVE AND FUNCTIONAL STATUS - GENERAL
STANDING UP FROM CHAIR USING ARMS: A LITTLE
WALKING IN HOSPITAL ROOM: A LITTLE
MOBILITY SCORE: 18
HELP NEEDED FOR BATHING: A LITTLE
MOVING TO AND FROM BED TO CHAIR: A LITTLE
TURNING FROM BACK TO SIDE WHILE IN FLAT BAD: A LITTLE
CLIMB 3 TO 5 STEPS WITH RAILING: A LITTLE
SUGGESTED CMS G CODE MODIFIER DAILY ACTIVITY: CK
PERSONAL GROOMING: A LITTLE
MOVING FROM LYING ON BACK TO SITTING ON SIDE OF FLAT BED: A LITTLE
TOILETING: A LITTLE
DRESSING REGULAR LOWER BODY CLOTHING: A LITTLE
DAILY ACTIVITIY SCORE: 19
SUGGESTED CMS G CODE MODIFIER MOBILITY: CK
DRESSING REGULAR UPPER BODY CLOTHING: A LITTLE

## 2020-06-13 ASSESSMENT — ENCOUNTER SYMPTOMS
HEARTBURN: 0
NECK PAIN: 0
FEVER: 0
NERVOUS/ANXIOUS: 0
VOMITING: 0
ORTHOPNEA: 0
DIZZINESS: 0
NAUSEA: 0
DEPRESSION: 0
SENSORY CHANGE: 0
CHILLS: 0
BRUISES/BLEEDS EASILY: 0
DIARRHEA: 0
PALPITATIONS: 0
CLAUDICATION: 0
TREMORS: 0
DIAPHORESIS: 0
SPEECH CHANGE: 0
CONSTIPATION: 0
PHOTOPHOBIA: 0
DOUBLE VISION: 0
BLURRED VISION: 0
ABDOMINAL PAIN: 0
WEIGHT LOSS: 0
FOCAL WEAKNESS: 0
SORE THROAT: 0
WHEEZING: 1
FLANK PAIN: 0
HALLUCINATIONS: 0
COUGH: 1
SEIZURES: 0
STRIDOR: 0
HEMOPTYSIS: 0
SPUTUM PRODUCTION: 0
BLOOD IN STOOL: 0
SHORTNESS OF BREATH: 1
MYALGIAS: 0

## 2020-06-13 ASSESSMENT — FIBROSIS 4 INDEX: FIB4 SCORE: 2.22

## 2020-06-13 ASSESSMENT — PATIENT HEALTH QUESTIONNAIRE - PHQ9
1. LITTLE INTEREST OR PLEASURE IN DOING THINGS: NOT AT ALL
2. FEELING DOWN, DEPRESSED, IRRITABLE, OR HOPELESS: NOT AT ALL
SUM OF ALL RESPONSES TO PHQ9 QUESTIONS 1 AND 2: 0

## 2020-06-13 NOTE — PROGRESS NOTES
Dr. Ramirez notified of bradycardia, HR 45 from HR 65 at start of shift and hypotension. Orer to get EKG and start Levophed.

## 2020-06-13 NOTE — ASSESSMENT & PLAN NOTE
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

## 2020-06-13 NOTE — PROGRESS NOTES
ICU transfer note    Admit Date: 06/12/2020    Transfer Date: 06/13/2020    Service: R Internal Medicine Red Team  Attending Physician(s): Dr. Isaac        Senior Resident(s): Dr. Briscoe  [place] Resident(s): Dr. Rubalcava      Primary Diagnosis:    Submassive Pulmonary embolism    Secondary Diagnoses:                 Malignant neoplasm of left breast  Pulmonary hypertension   Essential hypertension   Elevated troponin    Hospital Summary (Brief Narrative):        Sheree is a 79 y.o. female who presented on  6/11/2020 for evaluation of dyspnea on exertion , onset 5 days ago.Ongoing medical conditions include Hyperthyroidism S/p thyroid resection, currently on Levothyroxine and  Hypertension. She was diagnosed with submassive PE on CTA.  She has RV distension on ct imaging and on ECHO. She is on oxygen 3 L nc, resting comfortably.  She is not on oxygen at home.  She is on lasix for chronic lower ext swelling. She was transferred to the ICU as she was considered hemodynamically unstable and received alteplase for monitoring. She received levophed drip for some time since she dropped her BP. She also received fluid bolus.     Patient /Hospital Summary (Details -- Problem Oriented) :        Acute pulmonary embolism with acute cor pulmonale (HCC)- (present on admission)  Assessment & Plan  On CT PE: Extensive bilateral pulmonary emboli involving the main pulmonary arteries, bilateral lower lobe, right middle lobe, and lingular segmental and subsegmental branches. Signs of right heart strain on CT and ECHO.  - Received 50 mg alteplase in the ICU. Continue heparin post fibrinolytic dose per protocol.  - on 3 L O2 now.   - US DVT negative   - PE provoked secondary to breast Ca.     Malignant neoplasm of left breast (HCC)- (present on admission)  Assessment & Plan  Biopsy done at [facility] a few days ago reveals invasive lobular carcinoma of the left  breast  Oncology evaluation pending     Pulmonary hypertension (HCC)- (present on admission)  Assessment & Plan  RVSP of 60 mm hg     Essential hypertension- (present on admission)  Assessment & Plan  History of hypertension   On lisinopril and diltiazem ( states its for Hypertension) denies history of Atrial fibrillation   Holding BP medications since she had low blood pressure.      Elevated troponin- (present on admission)  Assessment & Plan  Elevated troponin on presentation   Asymptomatic      PLAN   - repeat trop remained same. No chest pain or EKG changes.   - ECHO was unremarkable with EF 60%. Probably from the PE and right heart strain    Consultants:      Pulmonary    Procedures:        None    Imaging/ Testing:       EC-ECHOCARDIOGRAM COMPLETE W/O CONT   Final Result      CT-CTA CHEST PULMONARY ARTERY W/ RECONS   Final Result         1.  Extensive bilateral pulmonary emboli involving the main pulmonary arteries, bilateral lower lobe, right middle lobe, and lingular segmental and subsegmental branches.   2.  Appearance of heart suggests component of ventricular strain   3.  Small left pleural effusion   4.  Hepatomegaly   5.  Atherosclerosis and atherosclerotic coronary artery disease.      These findings were discussed with the patient's clinician, Dr. Rubalcava, on 6/11/2020 11:05 PM.      US-EXTREMITY VENOUS LOWER UNILAT RIGHT   Final Result      DX-CHEST-PORTABLE (1 VIEW)   Final Result      Likely layering pleural fluid especially on the left      Moderate cardiac silhouette enlargement with mild aortic ectasia           Disposition: To be transferred to the floor.     Labs and imaging studies to be continued with their indications:  Things to follow:   - Consult oncology for the management of breast cancer  - Monitor BP and restart medication as appropriate.  - Start her on oral anticoagulation as appropriate.     Della Hernandez M.D.

## 2020-06-13 NOTE — PROGRESS NOTES
[facility] Progress Note      Admit Date: 6/11/2020    Resident(s): Della Hernandez M.D.   Attending:  LEO MOSER/ Dr. Hawley    Patient ID:    Name:  Sheree Pinto   YOB: 1940  Age:  79 y.o.  female   MRN:  [id number]    Hospital Course (carried forward and updated):  Sheree is a 79 y.o. female who presented on  6/11/2020 for evaluation of dyspnea on exertion , onset 5 days ago.Ongoing medical conditions include Hyperthyroidism S/p thyroid resection, currently on Levothyroxine and  Hypertension. She was diagnosed with submassive PE on CTA.  She has RV distension on ct imaging and on ECHO. She is on oxygen 3 L nc, resting comfortably.  She is not on oxygen at home.  She is on lasix for chronic lower ext swelling.     Consultants:  Critical Care  IR    Interval Events:  - Completed 24 hours post tPA therapy at 8:50 AM. On heparin drip now.   - She received one IVF bolus overnight for low BP. She was on levophed drip for sometime for low BP but its stable this morning.   - In sinus thyrhm  - Ambulate patient as much as possible     Review of Systems   Constitutional: Positive for malaise/fatigue. Negative for chills and fever.   HENT: Negative for ear discharge and ear pain.    Eyes: Negative for blurred vision and double vision.   Respiratory: Positive for cough, shortness of breath and wheezing. Negative for hemoptysis and sputum production.    Cardiovascular: Positive for leg swelling. Negative for chest pain, palpitations and orthopnea.   Gastrointestinal: Negative for abdominal pain, constipation, diarrhea, nausea and vomiting.   Genitourinary: Negative for dysuria, frequency and urgency.   Musculoskeletal: Negative for myalgias.   Neurological: Negative for tremors, sensory change, speech change and focal weakness.       PHYSICAL EXAM:  Vitals:    06/13/20 0615 06/13/20 0700 06/13/20 0800 06/13/20 0900   BP: 108/66 (!) 90/52 106/59    Pulse: 60 63 (!) 53    Resp: 14 15 14    Temp:   "36.5 °C (97.7 °F) 36.4 °C (97.5 °F) 36.6 °C (97.8 °F)   TempSrc:  Temporal Temporal Temporal   SpO2: 100% 96% 100%    Weight:       Height:        Body mass index is 24.68 kg/m².  Latest Vitals:  /59   Pulse (!) 53   Temp 36.6 °C (97.8 °F) (Temporal)   Resp 14   Ht 1.6 m (5' 3\")   Wt 63.2 kg (139 lb 5.3 oz)   SpO2 100%   BMI 24.68 kg/m²   O2 therapy: Pulse Oximetry: 100 %, O2 (LPM): 3, O2 Delivery Device: Silicone Nasal Cannula  Vitals Range last 24h:  Temp:  [36.3 °C (97.4 °F)-36.8 °C (98.2 °F)] 36.6 °C (97.8 °F)  Pulse:  [46-74] 53  Resp:  [14-83] 14  BP: ()/(47-81) 106/59  SpO2:  [95 %-100 %] 100 %  Date 06/13/20 0700 - 06/14/20 0659   Shift 5682-7500 4720-3238 1966-9933 24 Hour Total   INTAKE   P.O. 100   100     P.O. 100   100   I.V. 122   122     Norepinephrine Volume 0   0     Heparin Volume 22   22     Volume (mL) (NS infusion) 100   100   Shift Total 222   222   OUTPUT   Urine 0   0     Number of Times Voided 0 x   0 x     Urine Void (mL) 0   0   Stool         Number of Times Stooled 0 x   0 x   Shift Total 0   0      222        Intake/Output Summary (Last 24 hours) at 6/13/2020 1012  Last data filed at 6/13/2020 0800  Gross per 24 hour   Intake 4299.48 ml   Output 400 ml   Net 3899.48 ml        Physical Exam   Constitutional: She is oriented to person, place, and time and well-developed, well-nourished, and in no distress. No distress.   HENT:   Head: Normocephalic and atraumatic.   Eyes: Pupils are equal, round, and reactive to light. EOM are normal.   Neck: Normal range of motion. Neck supple.   Cardiovascular: Normal rate, regular rhythm and normal heart sounds.   No murmur heard.  Pulmonary/Chest: Effort normal and breath sounds normal. She has no wheezes.   Reduced breath sounds bilaterally   Abdominal: Soft. Bowel sounds are normal. There is no abdominal tenderness. There is no rebound.   Musculoskeletal: Normal range of motion.         General: No edema.   Neurological: She " is alert and oriented to person, place, and time.   Skin: Skin is warm and dry.         Recent Labs     06/11/20  1503 06/12/20  0037 06/12/20 0248 06/13/20  0025   SODIUM 139 138  --  137   POTASSIUM 5.2 4.6  --  4.5   CHLORIDE 102 106  --  107   CO2 16* 18*  --  20   BUN 35* 31*  --  34*   CREATININE 1.45* 1.13  --  1.01   MAGNESIUM 2.4  --  2.2 2.3   CALCIUM 8.9 8.4*  --  7.9*     Recent Labs     06/11/20  1503 06/12/20  0037 06/13/20  0025   ALTSGPT 26 20  --    ASTSGOT 36 24  --    ALKPHOSPHAT 81 62  --    TBILIRUBIN 0.8 0.3  --    GLUCOSE 127* 113* 98     Recent Labs     06/12/20  0037 06/12/20  0248  06/12/20  1755 06/13/20  0025 06/13/20  0630   RBC 3.87*  --   --   --  3.37* 3.30*   HEMOGLOBIN 12.0  --   --   --  10.5* 10.4*   HEMATOCRIT 37.9  --   --   --  33.1* 32.7*   PLATELETCT 139*  --   --   --  171 191   PROTHROMBTM  --  16.9*  --  16.0*  --   --    APTT  --   --    < > 215.8* 82.1* 81.3*   INR  --  1.34*  --  1.25*  --   --     < > = values in this interval not displayed.     Recent Labs     06/11/20  1503 06/12/20 0037 06/13/20 0025 06/13/20  0630   WBC 12.2* 10.3 10.1 10.2   NEUTSPOLYS 72.80* 78.30* 73.80*  --    LYMPHOCYTES 15.30* 10.80* 16.10*  --    MONOCYTES 10.40 9.00 8.80  --    EOSINOPHILS 0.50 0.90 0.20  --    BASOPHILS 0.50 0.50 0.40  --    ASTSGOT 36 24  --   --    ALTSGPT 26 20  --   --    ALKPHOSPHAT 81 62  --   --    TBILIRUBIN 0.8 0.3  --   --        Meds:  • norepinephrine (Levophed) infusion  0-30 mcg/min Stopped (06/13/20 0530)   • midodrine  10 mg     • heparin  3,700 Units      And   • heparin   550 Units/hr (06/13/20 1450)   • MD Alert...Adult ICU Electrolyte Replacement per Pharmacy       • senna-docusate  2 Tab      And   • polyethylene glycol/lytes  1 Packet      And   • magnesium hydroxide  30 mL      And   • bisacodyl  10 mg     • Respiratory Therapy Consult       • albuterol  2 Puff     • levothyroxine  125 mcg     • NS   50 mL/hr at 06/13/20 6085         Procedures:  None    Imaging:  EC-ECHOCARDIOGRAM COMPLETE W/O CONT   Final Result      CT-CTA CHEST PULMONARY ARTERY W/ RECONS   Final Result         1.  Extensive bilateral pulmonary emboli involving the main pulmonary arteries, bilateral lower lobe, right middle lobe, and lingular segmental and subsegmental branches.   2.  Appearance of heart suggests component of ventricular strain   3.  Small left pleural effusion   4.  Hepatomegaly   5.  Atherosclerosis and atherosclerotic coronary artery disease.      These findings were discussed with the patient's clinician, Dr. Rubalcava, on 6/11/2020 11:05 PM.      US-EXTREMITY VENOUS LOWER UNILAT RIGHT   Final Result      DX-CHEST-PORTABLE (1 VIEW)   Final Result      Likely layering pleural fluid especially on the left      Moderate cardiac silhouette enlargement with mild aortic ectasia          Problem and Plan:  * Acute pulmonary embolism with acute cor pulmonale (HCC)- (present on admission)  Assessment & Plan  On CT PE: Extensive bilateral pulmonary emboli involving the main pulmonary arteries, bilateral lower lobe, right middle lobe, and lingular segmental and subsegmental branches. Signs of right heart strain on CT and ECHO.  - Received 50 mg alteplase in the ICU. Continue heparin post fibrinolytic dose per protocol.  - on 3 L O2 now.   - US DVT negative   - PE provoked secondary to breast Ca.    Malignant neoplasm of left breast (HCC)- (present on admission)  Assessment & Plan  Biopsy done at [facility] a few days ago reveals invasive lobular carcinoma of the left breast  Oncology evaluation pending    Pulmonary hypertension (HCC)- (present on admission)  Assessment & Plan  RVSP of 60 mm hg    Essential hypertension- (present on admission)  Assessment & Plan  History of hypertension   On lisinopril and diltiazem ( states its for Hypertension) denies history of Atrial fibrillation   Holding BP medications since she had low blood pressure.     Elevated  troponin- (present on admission)  Assessment & Plan  Elevated troponin on presentation   Asymptomatic     PLAN   - repeat trop remained same. No chest pain or EKG changes.   - ECHO was unremarkable with EF 60%. Probably from the PE and right heart strain    DISPO: To be transferred to the telemetry    CODE STATUS: DNAR I OK    Quality Measures:  Feeding: Oral  Analgesia: None  Sedation: None  Thromboprophylaxis: heparin drip  Head of bed: >30 degrees  Ulcer prophylaxis: None  Glycemic control: None  Bowel care: bowel regimen  Indwelling lines: PIV  Deescalation of antibiotics: None    Della Hernandez M.D.

## 2020-06-13 NOTE — PROGRESS NOTES
Received sign out for this patient from [facility] and have accepted the transfer.  Please refer to the [facility] transfer note and progress note from today for details

## 2020-06-13 NOTE — PROGRESS NOTES
Pt transferred from [facility] to [facility] 83 bed 1 with ICU nurse, Received bedside report from Millicent, verified pt heparin gtt, placed pt on tele monitor, called monitor room. Pt A/O x 4 no C/O pain at this time, on 3 l oxygen, will continue to monitor.

## 2020-06-13 NOTE — PROGRESS NOTES
2 RN skin check complete.       Devices in place: EKG leads and electrodes, O2 probe, BP cuff, SCDs,     Skin assessed under devices.    No new or potential skin break down.     The following interventions in place: Qshift 2 RN skin check, Q2 hr turns and device repositioning, low airloss mattress, moisturizer, nutrition, pillows to reposition extremities, mobility, padding applied to NC

## 2020-06-13 NOTE — PROGRESS NOTES
Levophed infusion started at 0305 HR46, BP 82/47. 1L NS bolus complete. Dr. Edgar updated, orders for midodrine ID received, 500ml NS bolus and OK for Levophed to infuse peripherally if rate under 5mcg/min.

## 2020-06-13 NOTE — ASSESSMENT & PLAN NOTE
Biopsy done at [facility] a few days ago reveals invasive lobular carcinoma of the left breast  Oncology consultation

## 2020-06-13 NOTE — PROGRESS NOTES
Critical Care Progress Note    Date of admission  6/11/2020    Chief Complaint  79 y.o. female admitted 6/11/2020 with shortness of breath.  Imaging revealed evidence of pulmonary embolism with right heart strain.    Hospital Course   6/12 -    alteplase today.  Keep in ICU.  Follow alteplase with heparin on the post fibrinolytic protocol.  6/13 -    improved today.  Okay to transfer out of ICU.  Continue heparin drip.      Interval Problem Update  Reviewed last 24 hour events:      PE  Alteplase given yesterday - 50 mg  Oriented x 4  PERRL  Brisk  SB-SR  3 L NC  Regular diet  Good UOP  Hep 550  NS 50  T-max 98.1  +3678 mL in the last 24 hours +4778 mL since admit      She feels better today.  Her weakness has improved.  Her shortness of breath is improved.  She has no angina, palpitations or syncope.  She denies any cough, sputum production or hemoptysis.  She is tolerating her diet.  She has no abdominal pain, nausea or vomiting.      Review of Systems  Review of Systems   Constitutional: Negative for diaphoresis, fever and weight loss.   HENT: Negative for ear pain, hearing loss and sore throat.    Eyes: Negative for blurred vision, double vision and photophobia.   Respiratory: Negative for hemoptysis and stridor.    Cardiovascular: Positive for leg swelling. Negative for palpitations and claudication.   Gastrointestinal: Negative for abdominal pain, blood in stool and heartburn.   Genitourinary: Negative for dysuria, flank pain and hematuria.   Musculoskeletal: Negative for myalgias and neck pain.   Skin: Negative for itching.   Neurological: Negative for dizziness, sensory change, speech change, focal weakness and seizures.   Endo/Heme/Allergies: Does not bruise/bleed easily.   Psychiatric/Behavioral: Negative for depression and hallucinations. The patient is not nervous/anxious.         Vital Signs for last 24 hours   Temp:  [36.3 °C (97.4 °F)-36.7 °C (98.1 °F)] 36.4 °C (97.6 °F)  Pulse:  [46-79] 69  Resp:   [12-83] 15  BP: ()/(47-81) 131/63  SpO2:  [95 %-100 %] 98 %    Hemodynamic parameters for last 24 hours       Respiratory Information for the last 24 hours       Physical Exam   Physical Exam  Constitutional:       General: She is not in acute distress.     Appearance: She is not toxic-appearing or diaphoretic.   HENT:      Head: Normocephalic and atraumatic.      Right Ear: External ear normal.      Left Ear: External ear normal.      Nose: Nose normal.      Mouth/Throat:      Mouth: Mucous membranes are moist.      Pharynx: No oropharyngeal exudate.   Eyes:      General:         Right eye: No discharge.         Left eye: No discharge.      Pupils: Pupils are equal, round, and reactive to light.   Neck:      Musculoskeletal: Normal range of motion. No neck rigidity.   Cardiovascular:      Pulses: Normal pulses.      Heart sounds: No murmur. No gallop.       Comments: Sinus rhythm  Pulmonary:      Effort: Pulmonary effort is normal. No respiratory distress.      Breath sounds: No wheezing or rales.   Abdominal:      General: Bowel sounds are normal. There is no distension.      Palpations: Abdomen is soft.      Tenderness: There is no abdominal tenderness. There is no guarding.   Musculoskeletal: Normal range of motion.         General: No tenderness.      Right lower leg: No edema.      Left lower leg: No edema.      Comments: No clubbing or cyanosis   Skin:     General: Skin is warm and dry.      Capillary Refill: Capillary refill takes less than 2 seconds.   Neurological:      General: No focal deficit present.      Mental Status: She is alert and oriented to person, place, and time.      Cranial Nerves: No cranial nerve deficit.      Comments: No focal weakness   Psychiatric:         Mood and Affect: Mood normal.         Behavior: Behavior normal.         Judgment: Judgment normal.         Medications  Current Facility-Administered Medications   Medication Dose Route Frequency Provider Last Rate Last Dose    • midodrine (PROAMATINE) tablet 5 mg  5 mg Oral TID WITH MEALS Mo Hawley M.D.       • heparin injection 3,700 Units  3,700 Units Intravenous PRN Krishna Edgar M.D.   3,700 Units at 06/12/20 1149    And   • heparin infusion 25,000 units in 500 mL 0.45% NACL   Intravenous Continuous Krishna Edgar M.D. 11 mL/hr at 06/13/20 1213 550 Units/hr at 06/13/20 1213   • senna-docusate (PERICOLACE or SENOKOT S) 8.6-50 MG per tablet 2 Tab  2 Tab Oral BID Can Rubalcava M.D.        And   • polyethylene glycol/lytes (MIRALAX) PACKET 1 Packet  1 Packet Oral QDAY PRN Can Rubalcava M.D.        And   • magnesium hydroxide (MILK OF MAGNESIA) suspension 30 mL  30 mL Oral QDAY PRN Can Rubalcava M.D.        And   • bisacodyl (DULCOLAX) suppository 10 mg  10 mg Rectal QDAY PRN Can Rubalcava M.D.       • Respiratory Therapy Consult   Nebulization Continuous RT Can Rubalcava M.D.       • albuterol inhaler 2 Puff  2 Puff Inhalation Q6HRS PRN Can Rubalcava M.D.       • levothyroxine (SYNTHROID) tablet 125 mcg  125 mcg Oral AM ES Can Rubalcava M.D.   125 mcg at 06/12/20 0554   • NS infusion   Intravenous Continuous Mo Hawley M.D. 50 mL/hr at 06/13/20 0305         Fluids    Intake/Output Summary (Last 24 hours) at 6/13/2020 1321  Last data filed at 6/13/2020 1000  Gross per 24 hour   Intake 4381.48 ml   Output 400 ml   Net 3981.48 ml       Laboratory          Recent Labs     06/11/20  1503 06/12/20  0037 06/12/20  0248 06/13/20  0025   SODIUM 139 138  --  137   POTASSIUM 5.2 4.6  --  4.5   CHLORIDE 102 106  --  107   CO2 16* 18*  --  20   BUN 35* 31*  --  34*   CREATININE 1.45* 1.13  --  1.01   MAGNESIUM 2.4  --  2.2 2.3   CALCIUM 8.9 8.4*  --  7.9*     Recent Labs     06/11/20  1503 06/12/20  0037 06/13/20  0025   ALTSGPT 26 20  --    ASTSGOT 36 24  --    ALKPHOSPHAT 81 62  --    TBILIRUBIN 0.8 0.3  --    GLUCOSE 127* 113* 98     Recent Labs     06/11/20  1503 06/12/20  0037 06/13/20  0025 06/13/20  0630   WBC  12.2* 10.3 10.1 10.2   NEUTSPOLYS 72.80* 78.30* 73.80*  --    LYMPHOCYTES 15.30* 10.80* 16.10*  --    MONOCYTES 10.40 9.00 8.80  --    EOSINOPHILS 0.50 0.90 0.20  --    BASOPHILS 0.50 0.50 0.40  --    ASTSGOT 36 24  --   --    ALTSGPT 26 20  --   --    ALKPHOSPHAT 81 62  --   --    TBILIRUBIN 0.8 0.3  --   --      Recent Labs     06/12/20  0037 06/12/20  0248  06/12/20  1755 06/13/20  0025 06/13/20  0630   RBC 3.87*  --   --   --  3.37* 3.30*   HEMOGLOBIN 12.0  --   --   --  10.5* 10.4*   HEMATOCRIT 37.9  --   --   --  33.1* 32.7*   PLATELETCT 139*  --   --   --  171 191   PROTHROMBTM  --  16.9*  --  16.0*  --   --    APTT  --   --    < > 215.8* 82.1* 81.3*   INR  --  1.34*  --  1.25*  --   --     < > = values in this interval not displayed.       Imaging  None    Assessment/Plan  * Acute pulmonary embolism with acute cor pulmonale (HCC)- (present on admission)  Assessment & Plan  Acute submassive pulmonary embolism  Associated right heart strain and pulmonary hypertension  S/P 50 mg of alteplase  Continue heparin drip on the post fibrinolytic protocol    Malignant neoplasm of left breast (HCC)- (present on admission)  Assessment & Plan  Biopsy done at [facility] a few days ago reveals invasive lobular carcinoma of the left breast  Oncology consultation    Pulmonary hypertension (HCC)- (present on admission)  Assessment & Plan  RVSP 60 mmHg  Due to acute pulmonary embolism    Essential hypertension- (present on admission)  Assessment & Plan  Her blood pressure was running low this morning  Improved with IV fluids and midodrine  Decrease midodrine, 5 mg 3 times daily    Elevated troponin- (present on admission)  Assessment & Plan  From right heart strain and acute PE       VTE:  Heparin  Ulcer: Not Indicated  Lines: None    I have performed a physical exam and reviewed and updated ROS and Plan today (6/13/2020). In review of yesterday's note (6/12/2020), there are no changes except as documented above.      OK to transfer out of ICU.  [facility] will sign off.  Please call if you have any questions.    Discussed patient condition and risk of morbidity and/or mortality with RN, RT, Pharmacy, [facility] resident, Charge nurse / hot rounds and QA team     Mo Hawley MD  Pulmonary and Critical Care Medicine

## 2020-06-13 NOTE — CARE PLAN
Problem: Communication  Goal: The ability to communicate needs accurately and effectively will improve  Intervention: [place] patient and significant other/support system to call light to alert staff of needs  Flowsheets (Taken 6/13/2020 0829)  Oriented to:: All of the Following : Location of Bathroom, Visiting Policy, Unit Routine, Call Light and Bedside Controls, Bedside Rail Policy, Smoking Policy, Rights and Responsibilities, Bedside Report, and Patient Education Notebook  Intervention: Educate patient and significant other/support system about the plan of care, procedures, treatments, medications and allow for questions  Flowsheets (Taken 6/13/2020 0829)  Pt & Family Have Been Educated on Methods Available to Report Concerns Related to Care, Treatment, Services, and Patient Safety Issues: Yes

## 2020-06-13 NOTE — PROGRESS NOTES
Per Dr. Edgar parameters forrestarting Levophed if HR below 35 or MAP below 60. Currently HR 71, /64; Levophed stopped at 0530.

## 2020-06-14 ENCOUNTER — APPOINTMENT (OUTPATIENT)
Dept: RADIOLOGY | Facility: MEDICAL CENTER | Age: 80
DRG: 175 | End: 2020-06-14
Attending: STUDENT IN AN ORGANIZED HEALTH CARE EDUCATION/TRAINING PROGRAM
Payer: COMMERCIAL

## 2020-06-14 LAB
ANION GAP SERPL CALC-SCNC: 8 MMOL/L (ref 7–16)
APTT PPP: 102.6 SEC (ref 24.7–36)
APTT PPP: 72 SEC (ref 24.7–36)
BASOPHILS # BLD AUTO: 0.8 % (ref 0–1.8)
BASOPHILS # BLD: 0.06 K/UL (ref 0–0.12)
BUN SERPL-MCNC: 26 MG/DL (ref 8–22)
CALCIUM SERPL-MCNC: 7.7 MG/DL (ref 8.5–10.5)
CHLORIDE SERPL-SCNC: 111 MMOL/L (ref 96–112)
CO2 SERPL-SCNC: 22 MMOL/L (ref 20–33)
CREAT SERPL-MCNC: 0.86 MG/DL (ref 0.5–1.4)
EOSINOPHIL # BLD AUTO: 0.21 K/UL (ref 0–0.51)
EOSINOPHIL NFR BLD: 2.8 % (ref 0–6.9)
ERYTHROCYTE [DISTWIDTH] IN BLOOD BY AUTOMATED COUNT: 50.2 FL (ref 35.9–50)
GLUCOSE SERPL-MCNC: 84 MG/DL (ref 65–99)
HCT VFR BLD AUTO: 34.3 % (ref 37–47)
HGB BLD-MCNC: 10.7 G/DL (ref 12–16)
IMM GRANULOCYTES # BLD AUTO: 0.03 K/UL (ref 0–0.11)
IMM GRANULOCYTES NFR BLD AUTO: 0.4 % (ref 0–0.9)
LYMPHOCYTES # BLD AUTO: 2.23 K/UL (ref 1–4.8)
LYMPHOCYTES NFR BLD: 30 % (ref 22–41)
MAGNESIUM SERPL-MCNC: 2.2 MG/DL (ref 1.5–2.5)
MCH RBC QN AUTO: 30.9 PG (ref 27–33)
MCHC RBC AUTO-ENTMCNC: 31.2 G/DL (ref 33.6–35)
MCV RBC AUTO: 99.1 FL (ref 81.4–97.8)
MONOCYTES # BLD AUTO: 0.7 K/UL (ref 0–0.85)
MONOCYTES NFR BLD AUTO: 9.4 % (ref 0–13.4)
NEUTROPHILS # BLD AUTO: 4.21 K/UL (ref 2–7.15)
NEUTROPHILS NFR BLD: 56.6 % (ref 44–72)
NRBC # BLD AUTO: 0 K/UL
NRBC BLD-RTO: 0 /100 WBC
PLATELET # BLD AUTO: 208 K/UL (ref 164–446)
PMV BLD AUTO: 9.7 FL (ref 9–12.9)
POTASSIUM SERPL-SCNC: 3.9 MMOL/L (ref 3.6–5.5)
RBC # BLD AUTO: 3.46 M/UL (ref 4.2–5.4)
SODIUM SERPL-SCNC: 141 MMOL/L (ref 135–145)
WBC # BLD AUTO: 7.4 K/UL (ref 4.8–10.8)

## 2020-06-14 PROCEDURE — 36415 COLL VENOUS BLD VENIPUNCTURE: CPT

## 2020-06-14 PROCEDURE — 700102 HCHG RX REV CODE 250 W/ 637 OVERRIDE(OP): Performed by: STUDENT IN AN ORGANIZED HEALTH CARE EDUCATION/TRAINING PROGRAM

## 2020-06-14 PROCEDURE — A9270 NON-COVERED ITEM OR SERVICE: HCPCS | Performed by: INTERNAL MEDICINE

## 2020-06-14 PROCEDURE — 83735 ASSAY OF MAGNESIUM: CPT

## 2020-06-14 PROCEDURE — 85025 COMPLETE CBC W/AUTO DIFF WBC: CPT

## 2020-06-14 PROCEDURE — 85730 THROMBOPLASTIN TIME PARTIAL: CPT

## 2020-06-14 PROCEDURE — 80048 BASIC METABOLIC PNL TOTAL CA: CPT

## 2020-06-14 PROCEDURE — A9270 NON-COVERED ITEM OR SERVICE: HCPCS | Performed by: STUDENT IN AN ORGANIZED HEALTH CARE EDUCATION/TRAINING PROGRAM

## 2020-06-14 PROCEDURE — 700102 HCHG RX REV CODE 250 W/ 637 OVERRIDE(OP): Performed by: INTERNAL MEDICINE

## 2020-06-14 PROCEDURE — 99233 SBSQ HOSP IP/OBS HIGH 50: CPT | Performed by: HOSPITALIST

## 2020-06-14 PROCEDURE — 70553 MRI BRAIN STEM W/O & W/DYE: CPT

## 2020-06-14 PROCEDURE — A9576 INJ PROHANCE MULTIPACK: HCPCS | Performed by: STUDENT IN AN ORGANIZED HEALTH CARE EDUCATION/TRAINING PROGRAM

## 2020-06-14 PROCEDURE — 770020 HCHG ROOM/CARE - TELE (206)

## 2020-06-14 PROCEDURE — 700117 HCHG RX CONTRAST REV CODE 255: Performed by: STUDENT IN AN ORGANIZED HEALTH CARE EDUCATION/TRAINING PROGRAM

## 2020-06-14 PROCEDURE — 700111 HCHG RX REV CODE 636 W/ 250 OVERRIDE (IP): Performed by: INTERNAL MEDICINE

## 2020-06-14 RX ADMIN — MIDODRINE HYDROCHLORIDE 5 MG: 5 TABLET ORAL at 12:15

## 2020-06-14 RX ADMIN — LEVOTHYROXINE SODIUM 125 MCG: 125 TABLET ORAL at 04:46

## 2020-06-14 RX ADMIN — MIDODRINE HYDROCHLORIDE 5 MG: 5 TABLET ORAL at 17:40

## 2020-06-14 RX ADMIN — HEPARIN SODIUM 550 UNITS/HR: 5000 INJECTION, SOLUTION INTRAVENOUS at 06:45

## 2020-06-14 RX ADMIN — APIXABAN 10 MG: 5 TABLET, FILM COATED ORAL at 08:54

## 2020-06-14 RX ADMIN — MIDODRINE HYDROCHLORIDE 5 MG: 5 TABLET ORAL at 08:53

## 2020-06-14 RX ADMIN — APIXABAN 10 MG: 5 TABLET, FILM COATED ORAL at 17:39

## 2020-06-14 RX ADMIN — GADOTERIDOL 13 ML: 279.3 INJECTION, SOLUTION INTRAVENOUS at 19:01

## 2020-06-14 ASSESSMENT — ENCOUNTER SYMPTOMS
NERVOUS/ANXIOUS: 0
BLURRED VISION: 0
CHILLS: 0
SINUS PAIN: 0
WEAKNESS: 0
COUGH: 0
PALPITATIONS: 0
SORE THROAT: 0
HEADACHES: 0
SHORTNESS OF BREATH: 1
FLANK PAIN: 0
DOUBLE VISION: 0
FEVER: 0
FALLS: 0
VOMITING: 0
NAUSEA: 0

## 2020-06-14 ASSESSMENT — FIBROSIS 4 INDEX: FIB4 SCORE: 2.04

## 2020-06-14 ASSESSMENT — PATIENT HEALTH QUESTIONNAIRE - PHQ9
SUM OF ALL RESPONSES TO PHQ9 QUESTIONS 1 AND 2: 0
1. LITTLE INTEREST OR PLEASURE IN DOING THINGS: NOT AT ALL
2. FEELING DOWN, DEPRESSED, IRRITABLE, OR HOPELESS: NOT AT ALL

## 2020-06-14 ASSESSMENT — LIFESTYLE VARIABLES: SUBSTANCE_ABUSE: 0

## 2020-06-14 NOTE — PROGRESS NOTES
Assumed care of pt @ 0715, bedside report received from Alma YATES.  Pt A&OX4 and denies any pain. Bed locked and lowered with call light within reach and questions answered during present time.  Observed bruising on L arm and IV on the right side.

## 2020-06-14 NOTE — CARE PLAN
Problem: Safety  Goal: Will remain free from falls  Outcome: PROGRESSING AS EXPECTED  Intervention: Implement fall precautions  Flowsheets (Taken 6/13/2020 1900)  Environmental Precautions:   Treaded Slipper Socks on Patient   Personal Belongings, Wastebasket, Call Bell etc. in Easy Reach   Report Given to Other Health Care Providers Regarding Fall Risk   Bed in Low Position   Communication Sign for Patients & Families   Mobility Assessed & Appropriate Sign Placed     Problem: Communication  Goal: The ability to communicate needs accurately and effectively will improve  Outcome: PROGRESSING AS EXPECTED  Intervention: Educate patient and significant other/support system about the plan of care, procedures, treatments, medications and allow for questions  Note:   Communication board updated. All pt questions answered. No further questions at this time. Pt encouraged to voice feelings and ask questions as they arise

## 2020-06-14 NOTE — PROGRESS NOTES
Assumed care at 1900, bedside report received from day shift RN. Pt is SR on the telemetry monitor. Patient is AO x 4 and is resting in bed. Initial assessment completed and orders reviewed. POC discussed with patient. Call light within reach and hourly rounding in place. No further questions at this time. Fall precautions in place.

## 2020-06-14 NOTE — PROGRESS NOTES
Internal Medicine Interval Note  Note Author: Cleopatra Briscoe M.D.     Name Sheree Pinto 1940   Age/Sex 79 y.o. female   MRN [id number]   Code Status DNAR/I OK     After 5PM or if no immediate response to page, please call for cross-coverage  Attending/Team: Dr. Isaac/Otto See Patient List for primary contact information  Call [phone] to page    1st Call - Day Intern (R1):   Dr. Jaimes 2nd Call - Day Sr. Resident (R2/R3):   Dr. Briscoe         Reason for interval visit  (Principal Problem)   Massive PE in setting of L breast cancer, now hemodynamically stable      Interval Problem Daily Status Update  (24 hours, problem oriented, brief subjective history, new lab/imaging data pertinent to that problem)   - currently, off of all pressors and on midodrine only with BPs in normal range  - stopping midodrine this morning ahead of anticipated discharge in next 1-2 days and will follow BPs; if stable and rising will consider restarting home anti-hypertensive (lisinopril)  - transitioning from heparin post-alteplase protocol to therapeutic eliquis (plan for 10 mg bid x 7 days, then 5 mg bid until outpt f/u) but also getting MRI brain w/ and w/o contrast to assess for mets from newly diagnosed L breast cancer. If mets present will revert to heparin as eliquis is less easily reversed with longer half-life (increased risk of ICH with brain mets)  - also ordering PT for now to assess fall risk in setting of anticoagulation  - this morning reports only SOB if NC is off; no other sx  - asked RN to remove infiltrated PIV line and perform SpO2 check at rest and with ambulation off of O2 as she is satting quite well (99%-100%) on O2  - not consulting Oncology for now as she can establish outpt     Review of Systems   Constitutional: Negative for chills and fever.   HENT: Negative for sinus pain and sore throat.    Eyes: Negative for blurred vision and double vision.   Respiratory: Positive for  shortness of breath. Negative for cough.         SOB with NC off   Cardiovascular: Negative for chest pain and palpitations.   Gastrointestinal: Negative for nausea and vomiting.   Genitourinary: Negative for dysuria, flank pain and hematuria.   Musculoskeletal: Negative for falls.        No peripheral edema   Skin:        Extensive bruising and PIV line infiltration   Neurological: Negative for weakness and headaches.   Psychiatric/Behavioral: Negative for substance abuse. The patient is not nervous/anxious.        Disposition/Barriers to discharge:   - awaiting imaging, PT evaluation, home oxygen test    Consultants/Specialty  Pulm/Crit  PCP: Priscilla Lyons M.D.      Quality Measures  Quality-Core Measures   Reviewed items::  Labs reviewed, Medications reviewed and Radiology images reviewed  Mendez catheter::  No Mendez  DVT: on therapeutic eliquis.      Physical Exam       Vitals:    06/13/20 2018 06/13/20 2341 06/14/20 0426 06/14/20 0743   BP: 130/82 125/71 127/85 139/80   Pulse: 74 84 80 71   Resp: 16 14 17 16   Temp: 36.2 °C (97.2 °F) 36.3 °C (97.3 °F) 35.9 °C (96.7 °F) 36.5 °C (97.7 °F)   TempSrc: Temporal Temporal Temporal Temporal   SpO2:  99% 99% 99%   Weight: 64.3 kg (141 lb 12.1 oz)      Height:         Body mass index is 25.11 kg/m². Weight: 64.3 kg (141 lb 12.1 oz)  Oxygen Therapy:  Pulse Oximetry: 99 %, O2 (LPM): 3, O2 Delivery Device: Silicone Nasal Cannula    Physical Exam   Constitutional: She is oriented to person, place, and time and well-developed, well-nourished, and in no distress. No distress.   HENT:   Head: Normocephalic and atraumatic.   Eyes: Conjunctivae are normal. Right eye exhibits no discharge. Left eye exhibits no discharge. No scleral icterus.   Cardiovascular: Normal rate and regular rhythm. Exam reveals no gallop and no friction rub.   No murmur heard.  Pulmonary/Chest: Breath sounds normal. No respiratory distress. She has no wheezes. She has no rales.   Abdominal: Bowel sounds  are normal. She exhibits no distension. There is no abdominal tenderness. There is no rebound.   No CVA tenderness   Musculoskeletal:         General: No edema.   Neurological: She is alert and oriented to person, place, and time. No cranial nerve deficit.   Skin: Skin is warm and dry. No rash noted. She is not diaphoretic.   Extensive bruising affecting all of L forearm as well as dorsal R hand; e/o PIV infiltration upper RUE   Psychiatric: Mood, memory, affect and judgment normal.             Assessment/Plan     * Acute pulmonary embolism with acute cor pulmonale (HCC)- (present on admission)  Assessment & Plan  On CT PE: Extensive bilateral pulmonary emboli involving the main pulmonary arteries, bilateral lower lobe, right middle lobe, and lingular segmental and subsegmental branches. Signs of right heart strain on CT and ECHO.  - Received 50 mg alteplase in the ICU. This morning switched from heparin post-alteplase protocol to therapeutic eliquis, but if MRI brain shows mets may switch back to heparin later today  - on 3 L O2 now.   - US DVT negative   - PE provoked secondary to breast Ca.    Malignant neoplasm of left breast (HCC)- (present on admission)  Assessment & Plan  [facility]opsy done at [facility] a few days ago reveals invasive lobular carcinoma of the left breast  Will f/u with Oncology outpt unless urgent issue discovered prior to d/c  Getting MRI brain today to assess for mets. Currently on Eliquis bid but if MRI reveals mets will revert back to heparin as it has much shorter half-life, in case of metastasis-related ICH    Pulmonary hypertension (HCC)- (present on admission)  Assessment & Plan  RVSP of 60 mm hg    Essential hypertension- (present on admission)  Assessment & Plan  History of hypertension   On lisinopril and diltiazem ( states its for Hypertension) denies history of Atrial fibrillation   Holding BP medications given hypotensive in ICU; today stopped midodrine. If BPs stable and  rising today may restart anti-hypertensives tomorrow    Elevated troponin- (present on admission)  Assessment & Plan  Elevated troponin on presentation   Asymptomatic     PLAN   - repeat trop remained same. No chest pain or EKG changes.   - ECHO was unremarkable with EF 60%. Probably from the PE and right heart strain

## 2020-06-14 NOTE — CARE PLAN
Problem: Communication  Goal: The ability to communicate needs accurately and effectively will improve  Outcome: PROGRESSING AS EXPECTED  Intervention: [place] patient and significant other/support system to call light to alert staff of needs  Note: complete  Intervention: Reorient patient to environment as needed  Note: complete     Problem: Venous Thromboembolism (VTW)/Deep Vein Thrombosis (DVT) Prevention:  Goal: Patient will participate in Venous Thrombosis (VTE)/Deep Vein Thrombosis (DVT)Prevention Measures  Outcome: PROGRESSING AS EXPECTED  Intervention: Assess and monitor for anticoagulation complications  Note: Pt on edge of bed, went for a walk

## 2020-06-15 PROBLEM — D32.9 MENINGIOMA (HCC): Status: ACTIVE | Noted: 2020-06-15

## 2020-06-15 LAB
ANION GAP SERPL CALC-SCNC: 8 MMOL/L (ref 7–16)
BASOPHILS # BLD AUTO: 1 % (ref 0–1.8)
BASOPHILS # BLD: 0.06 K/UL (ref 0–0.12)
BUN SERPL-MCNC: 23 MG/DL (ref 8–22)
CALCIUM SERPL-MCNC: 8.1 MG/DL (ref 8.5–10.5)
CHLORIDE SERPL-SCNC: 114 MMOL/L (ref 96–112)
CO2 SERPL-SCNC: 21 MMOL/L (ref 20–33)
CREAT SERPL-MCNC: 0.86 MG/DL (ref 0.5–1.4)
EKG IMPRESSION: NORMAL
EOSINOPHIL # BLD AUTO: 0.33 K/UL (ref 0–0.51)
EOSINOPHIL NFR BLD: 5.5 % (ref 0–6.9)
ERYTHROCYTE [DISTWIDTH] IN BLOOD BY AUTOMATED COUNT: 48.3 FL (ref 35.9–50)
GLUCOSE SERPL-MCNC: 78 MG/DL (ref 65–99)
HCT VFR BLD AUTO: 33.8 % (ref 37–47)
HGB BLD-MCNC: 10.6 G/DL (ref 12–16)
IMM GRANULOCYTES # BLD AUTO: 0.03 K/UL (ref 0–0.11)
IMM GRANULOCYTES NFR BLD AUTO: 0.5 % (ref 0–0.9)
LYMPHOCYTES # BLD AUTO: 1.98 K/UL (ref 1–4.8)
LYMPHOCYTES NFR BLD: 32.8 % (ref 22–41)
MAGNESIUM SERPL-MCNC: 2 MG/DL (ref 1.5–2.5)
MCH RBC QN AUTO: 30.5 PG (ref 27–33)
MCHC RBC AUTO-ENTMCNC: 31.4 G/DL (ref 33.6–35)
MCV RBC AUTO: 97.4 FL (ref 81.4–97.8)
MONOCYTES # BLD AUTO: 0.71 K/UL (ref 0–0.85)
MONOCYTES NFR BLD AUTO: 11.8 % (ref 0–13.4)
NEUTROPHILS # BLD AUTO: 2.92 K/UL (ref 2–7.15)
NEUTROPHILS NFR BLD: 48.4 % (ref 44–72)
NRBC # BLD AUTO: 0 K/UL
NRBC BLD-RTO: 0 /100 WBC
PLATELET # BLD AUTO: 237 K/UL (ref 164–446)
PMV BLD AUTO: 9.5 FL (ref 9–12.9)
POTASSIUM SERPL-SCNC: 4.1 MMOL/L (ref 3.6–5.5)
RBC # BLD AUTO: 3.47 M/UL (ref 4.2–5.4)
SODIUM SERPL-SCNC: 143 MMOL/L (ref 135–145)
WBC # BLD AUTO: 6 K/UL (ref 4.8–10.8)

## 2020-06-15 PROCEDURE — 97162 PT EVAL MOD COMPLEX 30 MIN: CPT

## 2020-06-15 PROCEDURE — A9270 NON-COVERED ITEM OR SERVICE: HCPCS | Performed by: STUDENT IN AN ORGANIZED HEALTH CARE EDUCATION/TRAINING PROGRAM

## 2020-06-15 PROCEDURE — 99232 SBSQ HOSP IP/OBS MODERATE 35: CPT | Performed by: HOSPITALIST

## 2020-06-15 PROCEDURE — 700102 HCHG RX REV CODE 250 W/ 637 OVERRIDE(OP): Performed by: STUDENT IN AN ORGANIZED HEALTH CARE EDUCATION/TRAINING PROGRAM

## 2020-06-15 PROCEDURE — 80048 BASIC METABOLIC PNL TOTAL CA: CPT

## 2020-06-15 PROCEDURE — 36415 COLL VENOUS BLD VENIPUNCTURE: CPT

## 2020-06-15 PROCEDURE — 93010 ELECTROCARDIOGRAM REPORT: CPT | Performed by: INTERNAL MEDICINE

## 2020-06-15 PROCEDURE — 93005 ELECTROCARDIOGRAM TRACING: CPT | Performed by: HOSPITALIST

## 2020-06-15 PROCEDURE — 83735 ASSAY OF MAGNESIUM: CPT

## 2020-06-15 PROCEDURE — 85025 COMPLETE CBC W/AUTO DIFF WBC: CPT

## 2020-06-15 PROCEDURE — 770006 HCHG ROOM/CARE - MED/SURG/GYN SEMI*

## 2020-06-15 PROCEDURE — 700105 HCHG RX REV CODE 258: Performed by: INTERNAL MEDICINE

## 2020-06-15 RX ADMIN — DOCUSATE SODIUM 50 MG AND SENNOSIDES 8.6 MG 2 TABLET: 8.6; 5 TABLET, FILM COATED ORAL at 05:51

## 2020-06-15 RX ADMIN — APIXABAN 10 MG: 5 TABLET, FILM COATED ORAL at 17:01

## 2020-06-15 RX ADMIN — SODIUM CHLORIDE 1000 ML: 9 INJECTION, SOLUTION INTRAVENOUS at 05:50

## 2020-06-15 RX ADMIN — LEVOTHYROXINE SODIUM 125 MCG: 125 TABLET ORAL at 05:51

## 2020-06-15 RX ADMIN — APIXABAN 10 MG: 5 TABLET, FILM COATED ORAL at 05:50

## 2020-06-15 ASSESSMENT — ENCOUNTER SYMPTOMS
CHILLS: 0
COUGH: 0
SORE THROAT: 0
DOUBLE VISION: 0
FALLS: 0
FLANK PAIN: 0
SHORTNESS OF BREATH: 1
HEADACHES: 0
NERVOUS/ANXIOUS: 0
FEVER: 0
WEAKNESS: 0
NAUSEA: 0
BLURRED VISION: 0
PALPITATIONS: 0
VOMITING: 0
SINUS PAIN: 0

## 2020-06-15 ASSESSMENT — LIFESTYLE VARIABLES: SUBSTANCE_ABUSE: 0

## 2020-06-15 ASSESSMENT — COGNITIVE AND FUNCTIONAL STATUS - GENERAL
CLIMB 3 TO 5 STEPS WITH RAILING: A LITTLE
MOVING TO AND FROM BED TO CHAIR: A LITTLE
MOBILITY SCORE: 22
SUGGESTED CMS G CODE MODIFIER MOBILITY: CJ

## 2020-06-15 ASSESSMENT — GAIT ASSESSMENTS
GAIT LEVEL OF ASSIST: SUPERVISED
DISTANCE (FEET): 100
DEVIATION: DECREASED BASE OF SUPPORT;BRADYKINETIC

## 2020-06-15 ASSESSMENT — FIBROSIS 4 INDEX: FIB4 SCORE: 1.788854381999831757

## 2020-06-15 NOTE — PROGRESS NOTES
Internal Medicine Interval Note  Note Author: Yohana Navas M.D.     Name Sheree Pinto     1940   Age/Sex 79 y.o. female   MRN [id number]   Code Status DNAR/I OK     After 5PM or if no immediate response to page, please call for cross-coverage  Attending/Team: Dr. Isaac/Otto See Patient List for primary contact information  Call [phone] to page    1st Call - Day Intern (R1):   Dr. Navas 2nd Call - Day Sr. Resident (R2/R3):   Dr. Briscoe         Reason for interval visit  (Principal Problem)   Massive PE in setting of L breast cancer, now hemodynamically stable      Interval Problem Daily Status Update  (24 hours, problem oriented, brief subjective history, new lab/imaging data pertinent to that problem)   - called  to updated on plan and MRI result  -MRI consistent with meningioma, no mets though  -neurosx (Dr. Cee) said to do repeat MRI in one year and follow up with [facility] on discharge  -d/c'd night vitals in order to optimize sleep overnight  -changing heparin to 10mg BID for 7 days (then 5mg BID until has follow up outpatient)     Review of Systems   Constitutional: Negative for chills and fever.   HENT: Negative for sinus pain and sore throat.    Eyes: Negative for blurred vision and double vision.   Respiratory: Positive for shortness of breath. Negative for cough.         SOB with NC off   Cardiovascular: Negative for chest pain and palpitations.   Gastrointestinal: Negative for nausea and vomiting.   Genitourinary: Negative for dysuria, flank pain and hematuria.   Musculoskeletal: Negative for falls.        No peripheral edema   Skin:        Extensive bruising and PIV line infiltration   Neurological: Negative for weakness and headaches.   Psychiatric/Behavioral: Negative for substance abuse. The patient is not nervous/anxious.        Disposition/Barriers to discharge:   PT evaluation      Consultants/Specialty  Pulm/Crit  PCP: Priscilla Lyons M.D.      Quality  Measures  Quality-Core Measures   Reviewed items::  Labs reviewed, Medications reviewed and Radiology images reviewed  Mendez catheter::  No Mendez  DVT: on therapeutic eliquis.      Physical Exam       Vitals:    06/15/20 0044 06/15/20 0410 06/15/20 0756 06/15/20 1131   BP: 154/114 122/67 130/77 142/73   Pulse: 89 80 82 61   Resp: 19 16 17 17   Temp: 36.7 °C (98 °F) 36.7 °C (98 °F) 36.2 °C (97.2 °F) 36.1 °C (97 °F)   TempSrc: Temporal Temporal Temporal Temporal   SpO2: 94% 95% 92% 94%   Weight:       Height:         Body mass index is 25.38 kg/m². Weight: 65 kg (143 lb 4.8 oz)  Oxygen Therapy:  Pulse Oximetry: 94 %, O2 (LPM): 0, O2 Delivery Device: None - Room Air    Physical Exam   Constitutional: She is oriented to person, place, and time and well-developed, well-nourished, and in no distress. No distress.   HENT:   Head: Normocephalic and atraumatic.   Eyes: Conjunctivae are normal. Right eye exhibits no discharge. Left eye exhibits no discharge. No scleral icterus.   Cardiovascular: Normal rate and regular rhythm. Exam reveals no gallop and no friction rub.   No murmur heard.  Pulmonary/Chest: Breath sounds normal. No respiratory distress. She has no wheezes. She has no rales.   Abdominal: Bowel sounds are normal. She exhibits no distension. There is no abdominal tenderness. There is no rebound.   No CVA tenderness   Musculoskeletal:         General: No edema.   Neurological: She is alert and oriented to person, place, and time. No cranial nerve deficit.   Skin: Skin is warm and dry. No rash noted. She is not diaphoretic.   Extensive bruising affecting all of L forearm as well as dorsal R hand; e/o PIV infiltration upper RUE   Psychiatric: Mood, memory, affect and judgment normal.             Assessment/Plan     Patient is a 78 Y/o female with a massive PE in setting of L breast cancer, now hemodynamically stable    * Acute pulmonary embolism with acute cor pulmonale (HCC)- (present on admission)  Assessment &  Plan  On CT PE: Extensive bilateral pulmonary emboli involving the main pulmonary arteries, bilateral lower lobe, right middle lobe, and lingular segmental and subsegmental branches. Signs of right heart strain on CT and ECHO.  --changing heparin to 10mg BID for 7 days (then 5mg BID until has follow up outpatient)  - on 3 L O2 now.   - US DVT negative   - PE provoked secondary to breast Ca.      Malignant neoplasm of left breast (HCC)- (present on admission)  Assessment & Plan  Biopsy done at [facility] a few days ago reveals invasive lobular carcinoma of the left breast  Will f/u with Oncology outpt unless urgent issue discovered prior to d/c      Pulmonary hypertension (HCC)- (present on admission)  Assessment & Plan  RVSP of 60 mm hg    Meningioma (HCC)  Assessment & Plan  -MRI consistent with meningioma, no mets though  -neurosx (Dr. Cee) said to do repeat MRI in one year and follow up with [facility] on discharge      Essential hypertension- (present on admission)  Assessment & Plan  History of hypertension   On lisinopril and diltiazem ( states its for Hypertension) denies history of Atrial fibrillation   Holding BP medications given hypotensive in ICU; today stopped midodrine. If BPs stable and rising may restart anti-hypertensives     Elevated troponin- (present on admission)  Assessment & Plan  Elevated troponin on presentation   Asymptomatic     PLAN   - repeat trop remained same. No chest pain or EKG changes.   - ECHO was unremarkable with EF 60%. Probably from the PE and right heart strain

## 2020-06-15 NOTE — ASSESSMENT & PLAN NOTE
-MRI consistent with meningioma, no mets though  -neurosx (Dr. Cee) said to do repeat MRI in one year and follow up with [facility] on discharge

## 2020-06-15 NOTE — THERAPY
"Physical Therapy   Initial Evaluation     Patient Name: Sheree Pinto  Age:  79 y.o., Sex:  female  Medical Record #: [id number]  Today's Date: 6/15/2020          Assessment  Patient is 79 y.o. female with past medical history of hypertension and hypothyroidism. Pt admiited with progressive exertional shortness of breath for past 6 days.   Pt newly diagnosed with breast CA.  Pt lives with her spouse in a [facility] in [place] and was fully IND with all mobility PTA. Pt presents today mildly weak and deconditioned with poor activity tolerance compared to her baseline. PT will follow pt during acute stay.     Plan    Recommend Physical Therapy 3 times per week until therapy goals are met for the following treatments:  Bed Mobility, Equipment, Gait Training, Neuro Re-Education / Balance, Self Care/Home Evaluation, Stair Training, Therapeutic Activities and Therapeutic Exercises    Discharge recommendations:  Recommend home health transitional care for continued physical therapy services.       Subjective    \"I would love to walk.\"     Objective       06/15/20 0820   Prior Living Situation   Prior Services Home-Independent   Housing / Facility 2 Story House   Steps Into Home 0   Steps In Home 12   Rail Left Rail (Steps in Home)   Equipment Owned None   Lives with - Patient's Self Care Capacity Spouse   Comments 2 adult daughters coming to assist at discharge.   Prior Level of Functional Mobility   Bed Mobility Independent   Transfer Status Independent   Ambulation Independent   Distance Ambulation (Feet) 300   Assistive Devices Used None   Stairs Independent   History of Falls   History of Falls No   Cognition    Cognition / Consciousness WDL   Level of Consciousness Alert   Comments cooperative   Passive ROM Lower Body   Passive ROM Lower Body WDL   Active ROM Lower Body    Active ROM Lower Body  WDL   Strength Lower Body   Gross Strength Generalized Weakness, Equal Bilaterally   Comments Grossly 4-/5 t/o. "   Sensation Lower Body   Lower Extremity Sensation   WDL   Lower Body Muscle Tone   Lower Body Muscle Tone  WDL   Coordination Lower Body    Coordination Lower Body  WDL   Balance Assessment   Sitting Balance (Static) Good   Sitting Balance (Dynamic) Fair +   Standing Balance (Static) Good   Standing Balance (Dynamic) Fair +   Weight Shift Sitting Good   Weight Shift Standing Good   Comments No AD   Gait Analysis   Gait Level Of Assist Supervised   Assistive Device None   Distance (Feet) 100   # of Times Distance was Traveled 3   Deviation Decreased Base Of Support;Bradykinetic   Skilled Intervention Compensatory Strategies;Verbal Cuing;Facilitation   Comments Cues for standing rest breaks due to SOB   Bed Mobility    Supine to Sit Supervised   Sit to Supine Supervised   Scooting Supervised   Rolling Supervised   Skilled Intervention Verbal Cuing   Functional Mobility   Sit to Stand Supervised   Bed, Chair, Wheelchair Transfer Supervised   Mobility gait in orellana   Skilled Intervention Verbal Cuing;Compensatory Strategies   Patient / Family Goals    Patient / Family Goal #1 Home   Short Term Goals    Short Term Goal # 1 Pt will be SPV for gait without AD >300' to improve functional mobility.   Short Term Goal # 2 Pt will be SPV for up/down 12 steps with rail to be able to access her bedroom in her home.   Problem List    Problems Functional Strength Deficit;Decreased Activity Tolerance;Impaired Ambulation   Anticipated Discharge Equipment   DC Equipment Unable To Determine At This Time

## 2020-06-15 NOTE — CARE PLAN
Pt demonstrates adequate use of call light function. Pt will remain free from fall and injury, pt on ivf, no acute events at present will monitor.

## 2020-06-15 NOTE — CARE PLAN
Problem: Communication  Goal: The ability to communicate needs accurately and effectively will improve  Outcome: PROGRESSING AS EXPECTED     Problem: Safety  Goal: Will remain free from falls  Outcome: PROGRESSING AS EXPECTED  Intervention: Assess risk factors for falls  Flowsheets (Taken 6/15/2020 0816)  Pt Calls for Assistance: Yes  Intervention: Implement fall precautions  Flowsheets (Taken 6/15/2020 0800)  Environmental Precautions:   Treaded Slipper Socks on Patient   Personal Belongings, Wastebasket, Call Bell etc. in Easy Reach   Report Given to Other Health Care Providers Regarding Fall Risk   Bed in Low Position   Communication Sign for Patients & Families   Mobility Assessed & Appropriate Sign Placed     Problem: Venous Thromboembolism (VTW)/Deep Vein Thrombosis (DVT) Prevention:  Goal: Patient will participate in Venous Thrombosis (VTE)/Deep Vein Thrombosis (DVT)Prevention Measures  Outcome: PROGRESSING AS EXPECTED  Intervention: Ensure patient wears graduated elastic stockings (SAMM hose) and/or SCDs, if ordered, when in bed or chair (Remove at least once per shift for skin check)  Note: Apixaban

## 2020-06-15 NOTE — DISCHARGE PLANNING
Care Transition Team Assessment               In case of emergency, NOK is spouse, Renaldo, [phone]      RN CM met with patient at bedside. Patient lives with her spouse in a 3 story home in [place] and was independent with all ADL/IADL's prior to admit. Patient uses no DME at baseline.   She sees Dr. Priscilla Lyons for PCP and uses the [facility] pharmacy in [facility]. Patient reports no financial concerns obtaining medications.   Patient given advance directive booklet to take home and discuss with family.   Patient states that her  can be ride home at discharge.    Information Source  Orientation : Oriented x 4  Information Given By: Patient  Who is responsible for making decisions for patient? : Patient    Readmission Evaluation  Is this a readmission?: No    Elopement Risk  Legal Hold: No  Ambulatory or Self Mobile in Wheelchair: Yes  Disoriented: No  Psychiatric Symptoms: None  History of Wandering: No  Elopement this Admit: No  Vocalizing Wanting to Leave: No  Displays Behaviors, Body Language Wanting to Leave: No-Not at Risk for Elopement  Elopement Risk: Not at Risk for Elopement    Interdisciplinary Discharge Planning  Does Admitting Nurse Feel This Could be a Complex Discharge?: No  Primary Care Physician: Priscilla Lyons  Lives with - Patient's Self Care Capacity: Spouse  Patient or legal guardian wants to designate a caregiver (see row info): No  Support Systems: Spouse / Significant Other  Housing / Facility: 3 Story House  Do You Take your Prescribed Medications Regularly: Yes  Able to Return to Previous ADL's: Yes  Prior Services: Home-Independent  Patient Expects to be Discharged to:: home  Durable Medical Equipment: Not Applicable    Discharge Preparedness  What is your plan after discharge?: Home with help  What are your discharge supports?: Spouse  Prior Functional Level: Ambulatory, Independent with Activities of Daily Living, Independent with Medication Management    Functional  Assesment  Prior Functional Level: Ambulatory, Independent with Activities of Daily Living, Independent with Medication Management    Finances  Financial Barriers to Discharge: No  Prescription Coverage: Yes    Advance Directive  Advance Directive?: None  Advance Directive offered?: AD Booklet given    Domestic Abuse  Have you ever been the victim of abuse or violence?: No  Physical Abuse or Sexual Abuse: No  Verbal Abuse or Emotional Abuse: No  Possible Abuse Reported to:: Not Applicable    Psychological Assessment  History of Substance Abuse: None  History of Psychiatric Problems: No    Anticipated Discharge Information  Anticipated discharge disposition: Home  Discharge Address: [address] [place] [address], [place] [address]  Discharge Contact Phone Number: [phone]

## 2020-06-15 NOTE — PROGRESS NOTES
Assumed care of patient and bedside report received from previous RN. Patient educated on importance of calling, bed controls on, bed locked and in lowest position, call light with patient. Appropriate fall precautions in place. Belongings and bedside table within reach. No needs at this time

## 2020-06-16 VITALS
RESPIRATION RATE: 16 BRPM | DIASTOLIC BLOOD PRESSURE: 69 MMHG | WEIGHT: 145.94 LBS | HEIGHT: 63 IN | TEMPERATURE: 97.7 F | SYSTOLIC BLOOD PRESSURE: 149 MMHG | OXYGEN SATURATION: 98 % | BODY MASS INDEX: 25.86 KG/M2 | HEART RATE: 60 BPM

## 2020-06-16 LAB
ANION GAP SERPL CALC-SCNC: 8 MMOL/L (ref 7–16)
BACTERIA BLD CULT: NORMAL
BACTERIA BLD CULT: NORMAL
BASOPHILS # BLD AUTO: 1.2 % (ref 0–1.8)
BASOPHILS # BLD: 0.07 K/UL (ref 0–0.12)
BUN SERPL-MCNC: 17 MG/DL (ref 8–22)
CALCIUM SERPL-MCNC: 7.8 MG/DL (ref 8.5–10.5)
CHLORIDE SERPL-SCNC: 109 MMOL/L (ref 96–112)
CO2 SERPL-SCNC: 21 MMOL/L (ref 20–33)
CREAT SERPL-MCNC: 0.78 MG/DL (ref 0.5–1.4)
EOSINOPHIL # BLD AUTO: 0.37 K/UL (ref 0–0.51)
EOSINOPHIL NFR BLD: 6.2 % (ref 0–6.9)
ERYTHROCYTE [DISTWIDTH] IN BLOOD BY AUTOMATED COUNT: 47.8 FL (ref 35.9–50)
GLUCOSE SERPL-MCNC: 84 MG/DL (ref 65–99)
HCT VFR BLD AUTO: 33.8 % (ref 37–47)
HGB BLD-MCNC: 10.7 G/DL (ref 12–16)
IMM GRANULOCYTES # BLD AUTO: 0.03 K/UL (ref 0–0.11)
IMM GRANULOCYTES NFR BLD AUTO: 0.5 % (ref 0–0.9)
LYMPHOCYTES # BLD AUTO: 1.98 K/UL (ref 1–4.8)
LYMPHOCYTES NFR BLD: 33.1 % (ref 22–41)
MAGNESIUM SERPL-MCNC: 1.9 MG/DL (ref 1.5–2.5)
MCH RBC QN AUTO: 30.7 PG (ref 27–33)
MCHC RBC AUTO-ENTMCNC: 31.7 G/DL (ref 33.6–35)
MCV RBC AUTO: 97.1 FL (ref 81.4–97.8)
MONOCYTES # BLD AUTO: 0.67 K/UL (ref 0–0.85)
MONOCYTES NFR BLD AUTO: 11.2 % (ref 0–13.4)
NEUTROPHILS # BLD AUTO: 2.86 K/UL (ref 2–7.15)
NEUTROPHILS NFR BLD: 47.8 % (ref 44–72)
NRBC # BLD AUTO: 0 K/UL
NRBC BLD-RTO: 0 /100 WBC
PLATELET # BLD AUTO: 245 K/UL (ref 164–446)
PMV BLD AUTO: 9.3 FL (ref 9–12.9)
POTASSIUM SERPL-SCNC: 3.9 MMOL/L (ref 3.6–5.5)
RBC # BLD AUTO: 3.48 M/UL (ref 4.2–5.4)
SIGNIFICANT IND 70042: NORMAL
SIGNIFICANT IND 70042: NORMAL
SITE SITE: NORMAL
SITE SITE: NORMAL
SODIUM SERPL-SCNC: 138 MMOL/L (ref 135–145)
SOURCE SOURCE: NORMAL
SOURCE SOURCE: NORMAL
WBC # BLD AUTO: 6 K/UL (ref 4.8–10.8)

## 2020-06-16 PROCEDURE — A9270 NON-COVERED ITEM OR SERVICE: HCPCS | Performed by: STUDENT IN AN ORGANIZED HEALTH CARE EDUCATION/TRAINING PROGRAM

## 2020-06-16 PROCEDURE — 80048 BASIC METABOLIC PNL TOTAL CA: CPT

## 2020-06-16 PROCEDURE — 83735 ASSAY OF MAGNESIUM: CPT

## 2020-06-16 PROCEDURE — 700102 HCHG RX REV CODE 250 W/ 637 OVERRIDE(OP): Performed by: STUDENT IN AN ORGANIZED HEALTH CARE EDUCATION/TRAINING PROGRAM

## 2020-06-16 PROCEDURE — 85025 COMPLETE CBC W/AUTO DIFF WBC: CPT

## 2020-06-16 PROCEDURE — 700105 HCHG RX REV CODE 258: Performed by: INTERNAL MEDICINE

## 2020-06-16 PROCEDURE — 99238 HOSP IP/OBS DSCHRG MGMT 30/<: CPT | Performed by: HOSPITALIST

## 2020-06-16 PROCEDURE — 36415 COLL VENOUS BLD VENIPUNCTURE: CPT

## 2020-06-16 RX ADMIN — APIXABAN 10 MG: 5 TABLET, FILM COATED ORAL at 05:25

## 2020-06-16 RX ADMIN — SODIUM CHLORIDE: 9 INJECTION, SOLUTION INTRAVENOUS at 05:25

## 2020-06-16 RX ADMIN — LEVOTHYROXINE SODIUM 125 MCG: 125 TABLET ORAL at 05:25

## 2020-06-16 NOTE — CARE PLAN
Problem: Respiratory:  Goal: Respiratory status will improve  Outcome: PROGRESSING AS EXPECTED  Intervention: Assess and monitor pulmonary status  Note: On room air, work of breathing within defined limits, clear lung sounds     Problem: Pain Management  Goal: Pain level will decrease to patient's comfort goal  Outcome: PROGRESSING AS EXPECTED

## 2020-06-16 NOTE — DISCHARGE SUMMARY
"Discharge Summary    CHIEF COMPLAINT ON ADMISSION  Chief Complaint   Patient presents with   • Shortness of Breath     since saturday. Pt from [place], pt sating 88% RA. hx of asthma       Reason for Admission  ems      Admission Date  6/11/2020    CODE STATUS  DNAR, I OK    HPI & HOSPITAL COURSE  Ms. Pinto is a 79 year old female with past medical history of hypertension, hypothyroidism status post thyroid resection on supplemental levothyroxine, and new diagnosis of left breast cancer per biopsy within one week of admission who presented with 5 days of progressive dyspnea on exertion. Although initially thought to have pneumonia due to finding of leukocytosis on initial workup, subsequent CTA chest instead found extensive bilateral pulmonary emboli \"involving hte main pulmonary arteries, bilateral lower lobe, right middle lobe, and lingular segmental and subsegmental branches\" per radiology read. Although initially findings seemed more consistent with submassive PE as BNP was highly elevated to 9641 pg/mL but vitals were stable on admission, it became apparent overnight that it had progressed to massive PE as vitals became unstable with progression to hypotension. We had initially planned to consult IR for catheter directed thrombolysis; however in setting of hemodynamic instability we instead transferred to ICU for systemic alteplase, with all home anti-hypertensives held, and pressors were given. She stabilized in ICU, then was subsequently transferred back to floor off of pressors (midodrine only) and post-alteplase heparin protocol. This was transitioned to therapeutic eliquis dosing (10 mg bid x 7 days, then 5 mg bid, with planned outpatient follow up for further medication guidance with PCP and Oncology). MRI was ordered to assess for brain metastases (which, if present, would have made eliquis a less desirable option for treatment given it is less reversible in the case of intracranial hemorrhage " related to brain metastases) and instead found a 2x2 cm meningioma without associated midline shift, vasogenic edema, or neurologic deficits. We called Neurosurgery to review case by phone and they recommended no surgical intervention but for patient to get repeat MRI in 1 year. PT was consulted to assess and recommended home health for ongoing therapy, but Ms. Pinto stated she wished to discuss with her PCP first. She was able to be weaned off of midodrine and BPs nelly to mildly hypertensive level. Therefore she was discharged with instruction to slowly restart home anti-hypertensives one medication at a time and to self-assess closely for signs of hypotension.     She is discharged in good condition with plans for follow up with Oncology and primary care.       Therefore, she is discharged in good and stable condition to home with close outpatient follow-up.    The patient met 2-midnight criteria for an inpatient stay at the time of discharge.    Discharge Date  6/16/2020    FOLLOW UP ITEMS POST DISCHARGE  - Follow up Oncology re: breast cancer treatment  - Follow up Primary Care for post-acute visit and duration of anticoagulation course and to get MRI brain in 1 year. PCP to evaluate for need of physical therapy services.    DISCHARGE DIAGNOSES  Principal Problem:    Acute pulmonary embolism with acute cor pulmonale (HCC) POA: Yes  Active Problems:    Pulmonary hypertension (HCC) POA: Yes    Malignant neoplasm of left breast (HCC) POA: Yes    Meningioma (HCC) POA: Unknown    Elevated troponin POA: Yes    Essential hypertension POA: Yes  Resolved Problems:    ARLET (acute kidney injury) (HCC) POA: Yes    Metabolic acidosis, increased anion gap (IAG) POA: Yes      FOLLOW UP  No future appointments.  Priscilla Lyons M.D.  [address] [address] [phone]    In 1 week      [facility]  [address], [address]  [facility] [address] [phone]  In 1  week        MEDICATIONS ON DISCHARGE     Medication List      START taking these medications      Instructions   * apixaban 5mg Tabs  Commonly known as:  ELIQUIS   Take 2 Tabs by mouth 2 Times a Day. Indications: DVT/PE  Dose:  10 mg     * apixaban 5mg Tabs  Start taking on:  June 21, 2020  Commonly known as:  ELIQUIS   Take 1 Tab by mouth 2 Times a Day. Indications: DVT/PE  Dose:  5 mg         * This list has 2 medication(s) that are the same as other medications prescribed for you. Read the directions carefully, and ask your doctor or other care provider to review them with you.            CONTINUE taking these medications      Instructions   albuterol 108 (90 Base) MCG/ACT Aers inhalation aerosol   Inhale 2 Puffs by mouth every 6 hours as needed for Shortness of Breath.  Dose:  2 Puff     budesonide 0.25 MG/2ML Susp  Commonly known as:  PULMICORT   250 mcg by Nebulization route 2 times a day.  Dose:  250 mcg     cyanocobalamin 500 MCG Tabs  Commonly known as:  VITAMIN B-12   Take 5,000 mcg by mouth every day.  Dose:  5,000 mcg     DILTIAZem 120 MG Tabs  Commonly known as:  CARDIZEM   Take 240 mg by mouth 3 times a day.  Dose:  240 mg     furosemide 20 MG Tabs  Commonly known as:  LASIX   Take 20 mg by mouth every day.  Dose:  20 mg     ipratropium 0.02 % Soln  Commonly known as:  ATROVENT   0.2 mg by Nebulization route.  Dose:  0.2 mg     iron dextran complex 50 MG/ML Soln  Commonly known as:  INFED   65 mg every day.  Dose:  65 mg     levothyroxine 125 MCG Tabs  Commonly known as:  SYNTHROID   Take 125 mcg by mouth Every morning on an empty stomach.  Dose:  125 mcg     lisinopril 20 MG Tabs  Commonly known as:  PRINIVIL   Take 20 mg by mouth every day.  Dose:  20 mg     magnesium hydroxide 400 MG/5ML Susp  Commonly known as:  MILK OF MAGNESIA   Take 30 mL by mouth 1 time daily as needed.  Dose:  30 mL     magnesium oxide 400 MG Tabs tablet  Commonly known as:  MAG-OX   Take 400 mg by mouth every day.  Dose:  400  mg     NS SOLN 60 mL with albuterol 2.5 mg/0.5 mL NEBU 100 mg   by Nebulization route.     potassium chloride 20 MEQ Pack  Commonly known as:  KLOR-CON   Take 20 mEq by mouth 2 times a day.  Dose:  20 mEq            Allergies  No Known Allergies    DIET  Orders Placed This Encounter   Procedures   • Diet Order Regular     Standing Status:   Standing     Number of Occurrences:   1     Order Specific Question:   Diet:     Answer:   Regular [1]       ACTIVITY  As tolerated.  Weight bearing as tolerated    CONSULTATIONS  Pulmonary/Critical Care    PROCEDURES  N/A    LABORATORY  Lab Results   Component Value Date    SODIUM 138 06/16/2020    POTASSIUM 3.9 06/16/2020    CHLORIDE 109 06/16/2020    CO2 21 06/16/2020    GLUCOSE 84 06/16/2020    BUN 17 06/16/2020    CREATININE 0.78 06/16/2020        Lab Results   Component Value Date    WBC 6.0 06/16/2020    HEMOGLOBIN 10.7 (L) 06/16/2020    HEMATOCRIT 33.8 (L) 06/16/2020    PLATELETCT 245 06/16/2020        Total time of the discharge process exceeds 35 minutes.

## 2020-06-16 NOTE — DISCHARGE INSTRUCTIONS
Discharge Instructions    Discharged to home by car with relative. Discharged via wheelchair, hospital escort: Yes.  Special equipment needed: Not Applicable    Be sure to schedule a follow-up appointment with your primary care doctor or any specialists as instructed.     Discharge Plan:   Diet Plan: Discussed  Activity Level: Discussed  Confirmed Follow up Appointment: Patient to Call and Schedule Appointment  Confirmed Symptoms Management: Discussed  Medication Reconciliation Updated: Yes    I understand that a diet low in cholesterol, fat, and sodium is recommended for good health. Unless I have been given specific instructions below for another diet, I accept this instruction as my diet prescription.   Other diet: regular    Special Instructions: None    · Is patient discharged on Warfarin / Coumadin?   No     Apixaban oral tablets  What is this medicine?  APIXABAN (a PIX a ban) is an anticoagulant (blood thinner). It is used to lower the chance of stroke in people with a medical condition called atrial fibrillation. It is also used to treat or prevent blood clots in the lungs or in the veins.  This medicine may be used for other purposes; ask your health care provider or pharmacist if you have questions.  COMMON BRAND NAME(S): Eliquis  What should I tell my health care provider before I take this medicine?  They need to know if you have any of these conditions:  -bleeding disorders  -bleeding in the brain  -blood in your stools (black or tarry stools) or if you have blood in your vomit  -history of stomach bleeding  -kidney disease  -liver disease  -mechanical heart valve  -an unusual or allergic reaction to apixaban, other medicines, foods, dyes, or preservatives  -pregnant or trying to get pregnant  -breast-feeding  How should I use this medicine?  Take this medicine by mouth with a glass of water. Follow the directions on the prescription label. You can take it with or without food. If it upsets your  stomach, take it with food. Take your medicine at regular intervals. Do not take it more often than directed. Do not stop taking except on your doctor's advice. Stopping this medicine may increase your risk of a blot clot. Be sure to refill your prescription before you run out of medicine.  Talk to your pediatrician regarding the use of this medicine in children. Special care may be needed.  Overdosage: If you think you have taken too much of this medicine contact a poison control center or emergency room at once.  NOTE: This medicine is only for you. Do not share this medicine with others.  What if I miss a dose?  If you miss a dose, take it as soon as you can. If it is almost time for your next dose, take only that dose. Do not take double or extra doses.  What may interact with this medicine?  This medicine may interact with the following:  -aspirin and aspirin-like medicines  -certain medicines for fungal infections like ketoconazole and itraconazole  -certain medicines for seizures like carbamazepine and phenytoin  -certain medicines that treat or prevent blood clots like warfarin, enoxaparin, and dalteparin  -clarithromycin  -NSAIDs, medicines for pain and inflammation, like ibuprofen or naproxen  -rifampin  -ritonavir  -[place]'s wort  This list may not describe all possible interactions. Give your health care provider a list of all the medicines, herbs, non-prescription drugs, or dietary supplements you use. Also tell them if you smoke, drink alcohol, or use illegal drugs. Some items may interact with your medicine.  What should I watch for while using this medicine?  Visit your doctor or health care professional for regular checks on your progress.  Notify your doctor or health care professional and seek emergency treatment if you develop breathing problems; changes in vision; chest pain; severe, sudden headache; pain, swelling, warmth in the leg; trouble speaking; sudden numbness or weakness of the face,  arm or leg. These can be signs that your condition has gotten worse.  If you are going to have surgery or other procedure, tell your doctor that you are taking this medicine.  What side effects may I notice from receiving this medicine?  Side effects that you should report to your doctor or health care professional as soon as possible:  -allergic reactions like skin rash, itching or hives, swelling of the face, lips, or tongue  -signs and symptoms of bleeding such as bloody or black, tarry stools; red or dark-brown urine; spitting up blood or brown material that looks like coffee grounds; red spots on the skin; unusual bruising or bleeding from the eye, gums, or nose  This list may not describe all possible side effects. Call your doctor for medical advice about side effects. You may report side effects to FDA at 9-095-RRR-8670.  Where should I keep my medicine?  Keep out of the reach of children.  Store at room temperature between 20 and 25 degrees C (68 and 77 degrees F). Throw away any unused medicine after the expiration date.  NOTE: This sheet is a summary. It may not cover all possible information. If you have questions about this medicine, talk to your doctor, pharmacist, or health care provider.  © 2018 [employer] (2017-07-10 11:54:23)    Pulmonary Embolism  A pulmonary embolism (PE) is a sudden blockage or decrease of blood flow in one lung or both lungs. Most blockages come from a blood clot that travels from the legs or the pelvis to the lungs. PE is a dangerous and potentially life-threatening condition if it is not treated right away.  What are the causes?  A pulmonary embolism occurs most commonly when a blood clot travels from one of your veins to your lungs. Rarely, PE is caused by air, fat, amniotic fluid, or part of a tumor traveling through your veins to your lungs.  What increases the risk?  A PE is more likely to develop in:  · People who smoke.  · People who are older, especially  over 60 years of age.  · People who are overweight (obese).  · People who sit or lie still for a long time, such as during long-distance travel (over 4 hours), bed rest, hospitalization, or during recovery from certain medical conditions like a stroke.  · People who do not engage in much physical activity (sedentary lifestyle).  · People who have chronic breathing disorders.  · People who have a personal or family history of blood clots or blood clotting disease.  · People who have peripheral vascular disease (PVD), diabetes, or some types of cancer.  · People who have heart disease, especially if the person had a recent heart attack or has congestive heart failure.  · People who have neurological diseases that affect the legs (leg paresis).  · People who have had a traumatic injury, such as breaking a hip or leg.  · People who have recently had major or lengthy surgery, especially on the hip, knee, or abdomen.  · People who have had a central line placed inside a large vein.  · People who take medicines that contain the hormone estrogen. These include birth control pills and hormone replacement therapy.  · Pregnancy or during childbirth or the postpartum period.  What are the signs or symptoms?  The symptoms of a PE usually start suddenly and include:  · Shortness of breath while active or at rest.  · Coughing or coughing up blood or blood-tinged mucus.  · Chest pain that is often worse with deep breaths.  · Rapid or irregular heartbeat.  · Feeling light-headed or dizzy.  · Fainting.  · Feeling anxious.  · Sweating.  There may also be pain and swelling in a leg if that is where the blood clot started.  These symptoms may represent a serious problem that is an emergency. Do not wait to see if the symptoms will go away. Get medical help right away. Call your local emergency services (911 in the [place] Do not drive yourself to the hospital.   How is this diagnosed?  Your health care provider will take a medical  history and perform a physical exam. You may also have other tests, including:  · Blood tests to assess the clotting properties of your blood, assess oxygen levels in your blood, and find blood clots.  · Imaging tests, such as CT, ultrasound, MRI, X-ray, and other tests to see if you have clots anywhere in your body.  · An electrocardiogram (ECG) to look for heart strain from blood clots in the lungs.  How is this treated?  The main goals of PE treatment are:  · To stop a blood clot from growing larger.  · To stop new blood clots from forming.  The type of treatment that you receive depends on many factors, such as the cause of your PE, your risk for bleeding or developing more clots, and other medical conditions that you have. Sometimes, a combination of treatments is necessary.  This condition may be treated with:  · Medicines, including newer oral blood thinners (anticoagulants), warfarin, low molecular weight heparins, thrombolytics, or heparins.  · Wearing compression stockings or using different types of devices.  · Surgery (rare) to remove the blood clot or to place a filter in your abdomen to stop the blood clot from traveling to your lungs.  Treatments for a PE are often divided into immediate treatment, long-term treatment (up to 3 months after PE), and extended treatment (more than 3 months after PE). Your treatment may continue for several months. This is called maintenance therapy, and it is used to prevent the forming of new blood clots. You can work with your health care provider to choose the treatment program that is best for you.  What are anticoagulants?   Anticoagulants are medicines that treat PEs. They can stop current blood clots from growing and stop new clots from forming. They cannot dissolve existing clots. Your body dissolves clots by itself over time. Anticoagulants are given by mouth, by injection, or through an IV tube.  What are thrombolytics?   Thrombolytics are clot-dissolving  medicines that are used to dissolve a PE. They carry a high risk of bleeding, so they tend to be used only in severe cases or if you have very low blood pressure.  Follow these instructions at home:  If you are taking a newer oral anticoagulant:  · Take the medicine every single day at the same time each day.  · Understand what foods and drugs interact with this medicine.  · Understand that there are no regular blood tests required when using this medicine.  · Understand the side effects of this medicine, including excessive bruising or bleeding. Ask your health care provider or pharmacist about other possible side effects.  If you are taking warfarin:  · Understand how to take warfarin and know which foods can affect how warfarin works in your body.  · Understand that it is dangerous to take too much or too little warfarin. Too much warfarin increases the risk of bleeding. Too little warfarin continues to allow the risk for blood clots.  · Follow your PT and INR blood testing schedule. The PT and INR results allow your health care provider to adjust your dose of warfarin. It is very important that you have your PT and INR tested as often as told by your health care provider.  · Avoid major changes in your diet, or tell your health care provider before you change your diet. Arrange a visit with a registered dietitian to answer your questions. Many foods, especially foods that are high in vitamin K, can interfere with warfarin and affect the PT and INR results. Eat a consistent amount of foods that are high in vitamin K, such as:  ¨ Spinach, kale, broccoli, cabbage, [place] greens, turnip greens, [place] sprouts, peas, cauliflower, seaweed, and parsley.  ¨ Beef liver and pork liver.  ¨ Green tea.  ¨ Soybean oil.  · Tell your health care provider about any and all medicines, vitamins, and supplements that you take, including aspirin and other over-the-counter anti-inflammatory medicines. Be especially cautious with  aspirin and anti-inflammatory medicines. Do not take those before you ask your health care provider if it is safe to do so. This is important because many medicines can interfere with warfarin and affect the PT and INR results.  · Do not start or stop taking any over-the-counter or prescription medicine unless your health care provider or pharmacist tells you to do so.  If you take warfarin, you will also need to do these things:  · Hold pressure over cuts for longer than usual.  · Tell your dentist and other health care providers that you are taking warfarin before you have any procedures in which bleeding may occur.  · Avoid alcohol or drink very small amounts. Tell your health care provider if you change your alcohol intake.  · Do not use tobacco products, including cigarettes, chewing tobacco, and e-cigarettes. If you need help quitting, ask your health care provider.  · Avoid contact sports.  General instructions  · Take over-the-counter and prescription medicines only as told by your health care provider. Anticoagulant medicines can have side effects, including easy bruising and difficulty stopping bleeding. If you are prescribed an anticoagulant, you will also need to do these things:  ¨ Hold pressure over cuts for longer than usual.  ¨ Tell your dentist and other health care providers that you are taking anticoagulants before you have any procedures in which bleeding may occur.  ¨ Avoid contact sports.  · Wear a medical alert bracelet or carry a medical alert card that says you have had a PE.  · Ask your health care provider how soon you can go back to your normal activities. Stay active to prevent new blood clots from forming.  · Make sure to exercise while traveling or when you have been sitting or standing for a long period of time. It is very important to exercise. Exercise your legs by walking or by tightening and relaxing your leg muscles often. Take frequent walks.  · Wear compression stockings as  told by your health care provider to help prevent more blood clots from forming.  · Do not use tobacco products, including cigarettes, chewing tobacco, and e-cigarettes. If you need help quitting, ask your health care provider.  · Keep all follow-up appointments with your health care provider. This is important.  How is this prevented?  Take these actions to decrease your risk of developing another PE:  · Exercise regularly. For at least 30 minutes every day, engage in:  ¨ Activity that involves moving your arms and legs.  ¨ Activity that encourages good blood flow through your body by increasing your heart rate.  · Exercise your arms and legs every hour during long-distance travel (over 4 hours). Drink plenty of water and avoid drinking alcohol while traveling.  · Avoid sitting or lying in bed for long periods of time without moving your legs.  · Maintain a weight that is appropriate for your height. Ask your health care provider what weight is healthy for you.  · If you are a woman who is over 35 years of age, avoid unnecessary use of medicines that contain estrogen. These include birth control pills.  · Do not smoke, especially if you take estrogen medicines. If you need help quitting, ask your health care provider.  · If you are at very high risk for PE, wear compression stockings.  · If you recently had a PE, have regularly scheduled ultrasound testing on your legs to check for new blood clots.  If you are hospitalized, prevention measures may include:  · Early walking after surgery, as soon as your health care provider says that it is safe.  · Receiving anticoagulants to prevent blood clots. If you cannot take anticoagulants, other options may be available, such as wearing compression stockings or using different types of devices.  Get help right away if:  · You have new or increased pain, swelling, or redness in an arm or leg.  · You have numbness or tingling in an arm or leg.  · You have shortness of breath  while active or at rest.  · You have chest pain.  · You have a rapid or irregular heartbeat.  · You feel light-headed or dizzy.  · You cough up blood.  · You notice blood in your vomit, bowel movement, or urine.  · You have a fever.  These symptoms may represent a serious problem that is an emergency. Do not wait to see if the symptoms will go away. Get medical help right away. Call your local emergency services (911 in the [place] Do not drive yourself to the hospital.   This information is not intended to replace advice given to you by your health care provider. Make sure you discuss any questions you have with your health care provider.  Document Released: 12/15/2001 Document Revised: 05/25/2017 Document Reviewed: 04/13/2016  [employer] Patient Education © 2017 [employer]      Depression / Suicide Risk    As you are discharged from this [facility] facility, it is important to learn how to keep safe from harming yourself.    Recognize the warning signs:  · Abrupt changes in personality, positive or negative- including increase in energy   · Giving away possessions  · Change in eating patterns- significant weight changes-  positive or negative  · Change in sleeping patterns- unable to sleep or sleeping all the time   · Unwillingness or inability to communicate  · Depression  · Unusual sadness, discouragement and loneliness  · Talk of wanting to die  · Neglect of personal appearance   · Rebelliousness- reckless behavior  · Withdrawal from people/activities they love  · Confusion- inability to concentrate     If you or a loved one observes any of these behaviors or has concerns about self-harm, here's what you can do:  · Talk about it- your feelings and reasons for harming yourself  · Remove any means that you might use to hurt yourself (examples: pills, rope, extension cords, firearm)  · Get professional help from the community (Mental Health, Substance Abuse, psychological counseling)  · Do not be  alone:Call your Safe Contact- someone whom you trust who will be there for you.  · Call your local CRISIS HOTLINE 768-1021 or 569-080-6340  · Call your local Children's Mobile Crisis Response Team [place] (928) 306-4281 or www.[employer]  · Call the toll free National Suicide Prevention Hotlines   · National Suicide Prevention Lifeline 316-375-CEFI (0728)  · National [employer] Line Network 800-SUICIDE (394-7473)

## 2020-06-16 NOTE — DISCHARGE PLANNING
Anticipated Discharge Disposition: home    Action:  RN CM met with patient to provide IMM. Patient is discharging home today with no needs. Her  is on his way to pick her up.     Barriers to Discharge: none    Plan: Patient to discharge home today

## 2020-06-16 NOTE — PROGRESS NOTES
Bedside report received. POC discussed with pt; all questions answered at this time. Pt sitting up in chair watching TV, denies any further needs.

## 2020-06-16 NOTE — PROGRESS NOTES
Patient discharged from hospital to home. Tele monitor and IV removed with no signs or symptoms of bleeding or infection. Discussed prescriptions, discharge education, and symptom management/signs of worsening symptoms with patient. Patient verbalized understanding. Patient aware to make follow-up appointment. Patient stable and vitals are within normal limits. Transported with RN by wheelchair to 's car.

## 2021-01-12 DIAGNOSIS — Z23 NEED FOR VACCINATION: ICD-10-CM

## 2022-09-01 ENCOUNTER — APPOINTMENT (RX ONLY)
Dept: URBAN - NONMETROPOLITAN AREA CLINIC 1 | Facility: CLINIC | Age: 82
Setting detail: DERMATOLOGY
End: 2022-09-01

## 2022-09-01 DIAGNOSIS — Z12.83 ENCOUNTER FOR SCREENING FOR MALIGNANT NEOPLASM OF SKIN: ICD-10-CM

## 2022-09-01 DIAGNOSIS — L72.0 EPIDERMAL CYST: ICD-10-CM

## 2022-09-01 DIAGNOSIS — L57.0 ACTINIC KERATOSIS: ICD-10-CM

## 2022-09-01 DIAGNOSIS — L82.0 INFLAMED SEBORRHEIC KERATOSIS: ICD-10-CM

## 2022-09-01 PROCEDURE — ? COUNSELING

## 2022-09-01 PROCEDURE — ? LIQUID NITROGEN

## 2022-09-01 PROCEDURE — 99202 OFFICE O/P NEW SF 15 MIN: CPT | Mod: 25

## 2022-09-01 PROCEDURE — 17003 DESTRUCT PREMALG LES 2-14: CPT | Mod: 59

## 2022-09-01 PROCEDURE — ? BENIGN DESTRUCTION COSMETIC

## 2022-09-01 PROCEDURE — 17110 DESTRUCTION B9 LES UP TO 14: CPT

## 2022-09-01 PROCEDURE — 17000 DESTRUCT PREMALG LESION: CPT | Mod: 59

## 2022-09-01 ASSESSMENT — LOCATION ZONE DERM
LOCATION ZONE: NECK
LOCATION ZONE: EYELID
LOCATION ZONE: ARM
LOCATION ZONE: TRUNK
LOCATION ZONE: FACE

## 2022-09-01 ASSESSMENT — LOCATION SIMPLE DESCRIPTION DERM
LOCATION SIMPLE: RIGHT EYELID
LOCATION SIMPLE: RIGHT FOREARM
LOCATION SIMPLE: RIGHT ANTERIOR NECK
LOCATION SIMPLE: LEFT SHOULDER
LOCATION SIMPLE: RIGHT CHEEK
LOCATION SIMPLE: RIGHT CLAVICULAR SKIN
LOCATION SIMPLE: RIGHT SHOULDER
LOCATION SIMPLE: LEFT UPPER BACK
LOCATION SIMPLE: LEFT FOREHEAD
LOCATION SIMPLE: RIGHT UPPER BACK

## 2022-09-01 ASSESSMENT — LOCATION DETAILED DESCRIPTION DERM
LOCATION DETAILED: RIGHT DISTAL ULNAR DORSAL FOREARM
LOCATION DETAILED: RIGHT CLAVICULAR NECK
LOCATION DETAILED: LEFT SUPERIOR UPPER BACK
LOCATION DETAILED: RIGHT CLAVICULAR SKIN
LOCATION DETAILED: RIGHT PROXIMAL ULNAR DORSAL FOREARM
LOCATION DETAILED: RIGHT LATERAL UPPER BACK
LOCATION DETAILED: LEFT POSTERIOR SHOULDER
LOCATION DETAILED: RIGHT ANTERIOR SHOULDER
LOCATION DETAILED: RIGHT INFERIOR LATERAL MALAR CHEEK
LOCATION DETAILED: RIGHT MID-UPPER BACK
LOCATION DETAILED: LEFT INFERIOR FOREHEAD
LOCATION DETAILED: RIGHT SUPERIOR MEDIAL UPPER BACK
LOCATION DETAILED: RIGHT MEDIAL CANTHUS

## 2022-09-01 NOTE — PROCEDURE: LIQUID NITROGEN
Consent: The patient's consent was obtained including but not limited to risks of crusting, scabbing, blistering, scarring, darker or lighter pigmentary change, recurrence, incomplete removal and infection.
Show Aperture Variable?: Yes
Duration Of Freeze Thaw-Cycle (Seconds): 0
Post-Care Instructions: I reviewed with the patient in detail post-care instructions. Patient is to wear sunprotection, and avoid picking at any of the treated lesions. Pt may apply Vaseline to crusted or scabbing areas.
Render Note In Bullet Format When Appropriate: No
Detail Level: Simple
Number Of Freeze-Thaw Cycles: 2 freeze-thaw cycles
Pared With?: 15 blade
Medical Necessity Clause: This procedure was medically necessary because the lesions that were treated were:
Medical Necessity Information: It is in your best interest to select a reason for this procedure from the list below. All of these items fulfill various CMS LCD requirements except the new and changing color options.
Spray Paint Text: The liquid nitrogen was applied to the skin utilizing a spray paint frosting technique.

## 2022-09-01 NOTE — PROCEDURE: BENIGN DESTRUCTION COSMETIC
Anesthesia Volume In Cc: 0.5
Consent: Discussed with patient risks of crusting, scabbing, blistering, scarring, darker or lighter pigmentary change, recurrence, incomplete removal and infection.
Post-Care Instructions: Patient instructed to wash treated area(s) with mild soap and water.  Patient is to wear sun protection, and avoid picking at the treated lesion.
Price (Use Numbers Only, No Special Characters Or $): 0
Detail Level: Detailed

## 2023-01-26 ENCOUNTER — APPOINTMENT (RX ONLY)
Dept: URBAN - NONMETROPOLITAN AREA CLINIC 1 | Facility: CLINIC | Age: 83
Setting detail: DERMATOLOGY
End: 2023-01-26

## 2023-01-26 DIAGNOSIS — L72.0 EPIDERMAL CYST: ICD-10-CM

## 2023-01-26 DIAGNOSIS — L57.0 ACTINIC KERATOSIS: ICD-10-CM

## 2023-01-26 PROCEDURE — ? BENIGN DESTRUCTION COSMETIC

## 2023-01-26 PROCEDURE — ? LIQUID NITROGEN

## 2023-01-26 PROCEDURE — 17003 DESTRUCT PREMALG LES 2-14: CPT

## 2023-01-26 PROCEDURE — 17000 DESTRUCT PREMALG LESION: CPT

## 2023-01-26 PROCEDURE — ? COUNSELING

## 2023-01-26 ASSESSMENT — LOCATION SIMPLE DESCRIPTION DERM
LOCATION SIMPLE: RIGHT CHEEK
LOCATION SIMPLE: LEFT FOREHEAD
LOCATION SIMPLE: LEFT EYEBROW

## 2023-01-26 ASSESSMENT — LOCATION DETAILED DESCRIPTION DERM
LOCATION DETAILED: LEFT INFERIOR FOREHEAD
LOCATION DETAILED: LEFT CENTRAL EYEBROW
LOCATION DETAILED: RIGHT INFERIOR LATERAL MALAR CHEEK

## 2023-01-26 ASSESSMENT — LOCATION ZONE DERM: LOCATION ZONE: FACE

## 2023-01-26 NOTE — PROCEDURE: LIQUID NITROGEN
Consent: The patient's consent was obtained including but not limited to risks of crusting, scabbing, blistering, scarring, darker or lighter pigmentary change, recurrence, incomplete removal and infection.
Show Aperture Variable?: Yes
Duration Of Freeze Thaw-Cycle (Seconds): 0
Post-Care Instructions: I reviewed with the patient in detail post-care instructions. Patient is to wear sunprotection, and avoid picking at any of the treated lesions. Pt may apply Vaseline to crusted or scabbing areas.
Render Note In Bullet Format When Appropriate: No
Detail Level: Simple
Number Of Freeze-Thaw Cycles: 2 freeze-thaw cycles

## 2023-09-01 ENCOUNTER — APPOINTMENT (RX ONLY)
Dept: URBAN - NONMETROPOLITAN AREA CLINIC 1 | Facility: CLINIC | Age: 83
Setting detail: DERMATOLOGY
End: 2023-09-01

## 2023-09-01 DIAGNOSIS — D22 MELANOCYTIC NEVI: ICD-10-CM

## 2023-09-01 DIAGNOSIS — L57.8 OTHER SKIN CHANGES DUE TO CHRONIC EXPOSURE TO NONIONIZING RADIATION: ICD-10-CM

## 2023-09-01 DIAGNOSIS — L57.0 ACTINIC KERATOSIS: ICD-10-CM

## 2023-09-01 DIAGNOSIS — Z12.83 ENCOUNTER FOR SCREENING FOR MALIGNANT NEOPLASM OF SKIN: ICD-10-CM

## 2023-09-01 DIAGNOSIS — I78.8 OTHER DISEASES OF CAPILLARIES: ICD-10-CM

## 2023-09-01 PROBLEM — D22.5 MELANOCYTIC NEVI OF TRUNK: Status: ACTIVE | Noted: 2023-09-01

## 2023-09-01 PROCEDURE — 99213 OFFICE O/P EST LOW 20 MIN: CPT | Mod: 25

## 2023-09-01 PROCEDURE — 17000 DESTRUCT PREMALG LESION: CPT

## 2023-09-01 PROCEDURE — ? LIQUID NITROGEN

## 2023-09-01 PROCEDURE — ? COUNSELING

## 2023-09-01 PROCEDURE — 17003 DESTRUCT PREMALG LES 2-14: CPT

## 2023-09-01 ASSESSMENT — LOCATION ZONE DERM
LOCATION ZONE: NOSE
LOCATION ZONE: FACE
LOCATION ZONE: TRUNK
LOCATION ZONE: LEG

## 2023-09-01 ASSESSMENT — LOCATION DETAILED DESCRIPTION DERM
LOCATION DETAILED: SUPERIOR THORACIC SPINE
LOCATION DETAILED: NASAL TIP
LOCATION DETAILED: RIGHT SUPERIOR MEDIAL UPPER BACK
LOCATION DETAILED: RIGHT CENTRAL ZYGOMA
LOCATION DETAILED: LEFT KNEE
LOCATION DETAILED: LEFT SUPERIOR CENTRAL MALAR CHEEK

## 2023-09-01 ASSESSMENT — LOCATION SIMPLE DESCRIPTION DERM
LOCATION SIMPLE: RIGHT ZYGOMA
LOCATION SIMPLE: UPPER BACK
LOCATION SIMPLE: NOSE
LOCATION SIMPLE: RIGHT UPPER BACK
LOCATION SIMPLE: LEFT KNEE
LOCATION SIMPLE: LEFT CHEEK

## 2023-09-01 NOTE — PROCEDURE: COUNSELING
Detail Level: Generalized
Detail Level: Zone
Detail Level: Detailed
Topical Retinoids Recommendations: Differin gel at bedtime as tolerated.

## 2023-09-01 NOTE — PROCEDURE: LIQUID NITROGEN
Render Note In Bullet Format When Appropriate: No
Duration Of Freeze Thaw-Cycle (Seconds): 0
Post-Care Instructions: I reviewed with the patient in detail post-care instructions. Patient is to wear sunprotection, and avoid picking at any of the treated lesions. Pt may apply Vaseline to crusted or scabbing areas.
Consent: The patient's consent was obtained including but not limited to risks of crusting, scabbing, blistering, scarring, darker or lighter pigmentary change, recurrence, incomplete removal and infection.
Number Of Freeze-Thaw Cycles: 2 freeze-thaw cycles
Show Applicator Variable?: Yes
Detail Level: Detailed

## 2023-09-01 NOTE — HPI: EVALUATION OF SKIN LESION(S)
Hpi Title: Evaluation of Skin Lesions
Pulmonary ProgressNote--[facility]   Lesli Robertson  : 1930  TAIWO: 2017  Time: 12:23 PM    Room: 206      Reason for Visit: Ventilator management, respiratory failure    Subjective: Patient doing well on TM.  PMV in place, however voice is very weak.  She tolerated TM yesterday for 11hrs.     Reviewed pertinent: Allergies, Medical History, Surgical History, Social History, Family History and Medications.  All pertinent notes in chart reviewed.    Discussed with: RN, RT    Weaning Progress: PS/TM    ROS: No SOB    Medications:  MAR reviewed    ALLERGIES:  Not on File    Physical Exam:   Vital signs reviewed  GENERAL: awake, alert, NAD, resting in bed  SKIN: normal color, warm, dry   HEENT: normocephalic, atraumatic, pupils equal, sclera and conjunctiva normal, no nasal flaring or pursed lip breathing  NECK: tracheostomy in place without significant bleeding, no accessory muscle use  LUNGS: equal ventilator sounds, decrease BS, no wheeze  HEART: regular rate and rhythm, normal s1 s2, +systolic murmur  ABDOMEN: soft, +bs, nontender   NEURO/PSYCH: awake, no tremors, moves extremities  EXTREMITIES: equal pulses bilaterally, no clubbing or cyanosis      Recent Labs  Lab 17  0525  17  0600   WBC 7.2  --  6.9   HGB 8.1*  --  7.6*   HCT 27.1*  --  25.8*     --  419   SEG 65  --  67   SODIUM 137  --  135   POTASSIUM 5.3*  < > 5.1   CHLORIDE 98  --  98   BUN 56*  --  60*   CREATININE 1.72*  --  1.64*   GLUCOSE 100*  --  109*   < > = values in this interval not displayed.    Imaging:  CXR 2017 reviewed:  Moderate bilateral pleural effusions with associated compressive atelectasis/consolidation in the lower lobes. Probable underlying mild/borderline vascular congestion    Impression:   Acute hypoxemic respiratory failure requiring tracheostomy  S/p bronch , +HSV  Severe Aortic stenosis  Pulmonary hypertension  Dementia  Obesity  Bilateral pleural effusions  Acute kidney 
Year Removed: 1900
injury superimposed on CKD stage 3  Hypertension  Normocytic anemia    Plan:    Wean per protocol, patient on TM.  Okay to lengthen as she tolerates.  PMV as tolerates  Seroquel started per PMD.    CXR reviewed  Trach cares per RT  Pulmonary hygiene and deep suctioning  Continue tube feedings via NGT  Trend creatinine and limit nephrotoxins  Monitor H/H, workup anemia per attending  PT/OT    Weaning Goal: Liberation    BRANDI Diaz    Patient seen and examined by me. Agree with above. Case and plan of treatment discussed with NP. Continue tracheostomy mask. Patient is using now Passy-Marie valve. Continue Passy-Plano valve as tolerated. Monitor oxygen saturation. Case discussed with respiratory therapy.    Clifford Robert MD

## 2023-12-27 ENCOUNTER — HOSPITAL ENCOUNTER (OUTPATIENT)
Dept: RADIOLOGY | Facility: MEDICAL CENTER | Age: 83
End: 2023-12-27

## 2023-12-27 ENCOUNTER — HOSPITAL ENCOUNTER (INPATIENT)
Facility: MEDICAL CENTER | Age: 83
LOS: 3 days | DRG: 375 | End: 2023-12-30
Attending: INTERNAL MEDICINE | Admitting: INTERNAL MEDICINE
Payer: COMMERCIAL

## 2023-12-27 DIAGNOSIS — I26.02 ACUTE SADDLE PULMONARY EMBOLISM WITH ACUTE COR PULMONALE (HCC): ICD-10-CM

## 2023-12-27 DIAGNOSIS — C16.9 PRIMARY CANCER OF STOMACH WITH METASTASIS TO OTHER SITE (HCC): ICD-10-CM

## 2023-12-27 PROBLEM — Z86.711 HISTORY OF PULMONARY EMBOLISM: Status: ACTIVE | Noted: 2023-12-27

## 2023-12-27 PROBLEM — K92.2 UPPER GI BLEED: Status: ACTIVE | Noted: 2023-12-27

## 2023-12-27 PROBLEM — D62 ACUTE BLOOD LOSS ANEMIA: Status: ACTIVE | Noted: 2023-12-27

## 2023-12-27 PROBLEM — Z71.89 ADVANCE CARE PLANNING: Status: ACTIVE | Noted: 2023-12-27

## 2023-12-27 LAB
ABO + RH BLD: NORMAL
ABO GROUP BLD: ABNORMAL
ALBUMIN SERPL BCP-MCNC: 2.6 G/DL (ref 3.2–4.9)
ALBUMIN/GLOB SERPL: 1.4 G/DL
ALP SERPL-CCNC: 134 U/L (ref 30–99)
ALT SERPL-CCNC: 21 U/L (ref 2–50)
ANION GAP SERPL CALC-SCNC: 7 MMOL/L (ref 7–16)
AST SERPL-CCNC: 48 U/L (ref 12–45)
BARCODED ABORH UBTYP: 5100
BARCODED PRD CODE UBPRD: ABNORMAL
BARCODED UNIT NUM UBUNT: ABNORMAL
BILIRUB SERPL-MCNC: 0.3 MG/DL (ref 0.1–1.5)
BLD GP AB INVEST PLASRBC-IMP: ABNORMAL
BLD GP AB SCN SERPL QL: ABNORMAL
BUN SERPL-MCNC: 39 MG/DL (ref 8–22)
CALCIUM ALBUM COR SERPL-MCNC: 8 MG/DL (ref 8.5–10.5)
CALCIUM SERPL-MCNC: 6.9 MG/DL (ref 8.5–10.5)
CFT BLD TEG: 3.7 MIN (ref 4.6–9.1)
CFT BLD TEG: 3.8 MIN (ref 4.6–9.1)
CFT P HPASE BLD TEG: 3.6 MIN (ref 4.3–8.3)
CFT P HPASE BLD TEG: 3.7 MIN (ref 4.3–8.3)
CHLORIDE SERPL-SCNC: 110 MMOL/L (ref 96–112)
CLOT ANGLE BLD TEG: 74.8 DEGREES (ref 63–78)
CLOT ANGLE BLD TEG: 76.5 DEGREES (ref 63–78)
CLOT LYSIS 30M P MA LENFR BLD TEG: 0 % (ref 0–2.6)
CO2 SERPL-SCNC: 21 MMOL/L (ref 20–33)
COMPONENT R 8504R: ABNORMAL
CREAT SERPL-MCNC: 0.95 MG/DL (ref 0.5–1.4)
CT.EXTRINSIC BLD ROTEM: 0.9 MIN (ref 0.8–2.1)
CT.EXTRINSIC BLD ROTEM: 1.1 MIN (ref 0.8–2.1)
ERYTHROCYTE [DISTWIDTH] IN BLOOD BY AUTOMATED COUNT: 53.2 FL (ref 35.9–50)
GFR SERPLBLD CREATININE-BSD FMLA CKD-EPI: 59 ML/MIN/1.73 M 2
GLOBULIN SER CALC-MCNC: 1.9 G/DL (ref 1.9–3.5)
GLUCOSE SERPL-MCNC: 95 MG/DL (ref 65–99)
HCT VFR BLD AUTO: 17.7 % (ref 37–47)
HGB BLD-MCNC: 5.4 G/DL (ref 12–16)
HGB BLD-MCNC: 7.6 G/DL (ref 12–16)
HGB BLD-MCNC: 9.1 G/DL (ref 12–16)
MCF BLD TEG: 61.9 MM (ref 52–69)
MCF BLD TEG: 66.3 MM (ref 52–69)
MCF.PLATELET INHIB BLD ROTEM: 18.3 MM (ref 15–32)
MCF.PLATELET INHIB BLD ROTEM: 21.2 MM (ref 15–32)
MCH RBC QN AUTO: 31.2 PG (ref 27–33)
MCHC RBC AUTO-ENTMCNC: 31.4 G/DL (ref 32.2–35.5)
MCV RBC AUTO: 99.4 FL (ref 81.4–97.8)
PA AA BLD-ACNC: 5.4 % (ref 0–11)
PA ADP BLD-ACNC: 11.8 % (ref 0–17)
PLATELET # BLD AUTO: 203 K/UL (ref 164–446)
PMV BLD AUTO: 9.2 FL (ref 9–12.9)
POTASSIUM SERPL-SCNC: 4.3 MMOL/L (ref 3.6–5.5)
PRODUCT TYPE UPROD: ABNORMAL
PROT SERPL-MCNC: 4.5 G/DL (ref 6–8.2)
RBC # BLD AUTO: 1.7 M/UL (ref 4.2–5.4)
RH BLD: ABNORMAL
SODIUM SERPL-SCNC: 138 MMOL/L (ref 135–145)
TEG ALGORITHM TGALG: ABNORMAL
TEG ALGORITHM TGALG: ABNORMAL
UNIT STATUS USTAT: ABNORMAL
WBC # BLD AUTO: 8.2 K/UL (ref 4.8–10.8)

## 2023-12-27 PROCEDURE — 99233 SBSQ HOSP IP/OBS HIGH 50: CPT | Mod: DUPCHRG | Performed by: HOSPITALIST

## 2023-12-27 PROCEDURE — 99223 1ST HOSP IP/OBS HIGH 75: CPT | Mod: 25,AI | Performed by: INTERNAL MEDICINE

## 2023-12-27 PROCEDURE — 700105 HCHG RX REV CODE 258: Performed by: STUDENT IN AN ORGANIZED HEALTH CARE EDUCATION/TRAINING PROGRAM

## 2023-12-27 PROCEDURE — 80053 COMPREHEN METABOLIC PANEL: CPT

## 2023-12-27 PROCEDURE — 86850 RBC ANTIBODY SCREEN: CPT

## 2023-12-27 PROCEDURE — 94640 AIRWAY INHALATION TREATMENT: CPT

## 2023-12-27 PROCEDURE — 85018 HEMOGLOBIN: CPT | Mod: 91

## 2023-12-27 PROCEDURE — 700105 HCHG RX REV CODE 258: Performed by: INTERNAL MEDICINE

## 2023-12-27 PROCEDURE — 99223 1ST HOSP IP/OBS HIGH 75: CPT | Performed by: NURSE PRACTITIONER

## 2023-12-27 PROCEDURE — C9113 INJ PANTOPRAZOLE SODIUM, VIA: HCPCS | Performed by: INTERNAL MEDICINE

## 2023-12-27 PROCEDURE — 85576 BLOOD PLATELET AGGREGATION: CPT | Mod: 91

## 2023-12-27 PROCEDURE — 85347 COAGULATION TIME ACTIVATED: CPT

## 2023-12-27 PROCEDURE — P9016 RBC LEUKOCYTES REDUCED: HCPCS | Mod: 91

## 2023-12-27 PROCEDURE — 700101 HCHG RX REV CODE 250: Performed by: INTERNAL MEDICINE

## 2023-12-27 PROCEDURE — 99497 ADVNCD CARE PLAN 30 MIN: CPT | Performed by: INTERNAL MEDICINE

## 2023-12-27 PROCEDURE — 85384 FIBRINOGEN ACTIVITY: CPT | Mod: 91

## 2023-12-27 PROCEDURE — 86922 COMPATIBILITY TEST ANTIGLOB: CPT | Mod: 91

## 2023-12-27 PROCEDURE — 700111 HCHG RX REV CODE 636 W/ 250 OVERRIDE (IP): Mod: JZ | Performed by: INTERNAL MEDICINE

## 2023-12-27 PROCEDURE — 85027 COMPLETE CBC AUTOMATED: CPT

## 2023-12-27 PROCEDURE — 30233N1 TRANSFUSION OF NONAUTOLOGOUS RED BLOOD CELLS INTO PERIPHERAL VEIN, PERCUTANEOUS APPROACH: ICD-10-PCS | Performed by: INTERNAL MEDICINE

## 2023-12-27 PROCEDURE — 36430 TRANSFUSION BLD/BLD COMPNT: CPT

## 2023-12-27 PROCEDURE — 770000 HCHG ROOM/CARE - INTERMEDIATE ICU *

## 2023-12-27 PROCEDURE — 86900 BLOOD TYPING SEROLOGIC ABO: CPT

## 2023-12-27 PROCEDURE — 86901 BLOOD TYPING SEROLOGIC RH(D): CPT

## 2023-12-27 PROCEDURE — 86870 RBC ANTIBODY IDENTIFICATION: CPT

## 2023-12-27 RX ORDER — BUDESONIDE 0.25 MG/2ML
0.25 INHALANT ORAL 2 TIMES DAILY
Status: DISCONTINUED | OUTPATIENT
Start: 2023-12-27 | End: 2023-12-30 | Stop reason: HOSPADM

## 2023-12-27 RX ORDER — MORPHINE SULFATE 4 MG/ML
2 INJECTION INTRAVENOUS
Status: DISCONTINUED | OUTPATIENT
Start: 2023-12-27 | End: 2023-12-30 | Stop reason: HOSPADM

## 2023-12-27 RX ORDER — MAGNESIUM OXIDE 400 MG/1
400 TABLET ORAL DAILY
Status: DISCONTINUED | OUTPATIENT
Start: 2023-12-27 | End: 2023-12-27

## 2023-12-27 RX ORDER — ONDANSETRON 2 MG/ML
4 INJECTION INTRAMUSCULAR; INTRAVENOUS EVERY 4 HOURS PRN
Status: DISCONTINUED | OUTPATIENT
Start: 2023-12-27 | End: 2023-12-30 | Stop reason: HOSPADM

## 2023-12-27 RX ORDER — IPRATROPIUM BROMIDE AND ALBUTEROL SULFATE 2.5; .5 MG/3ML; MG/3ML
3 SOLUTION RESPIRATORY (INHALATION) EVERY 4 HOURS PRN
COMMUNITY

## 2023-12-27 RX ORDER — OXYCODONE HYDROCHLORIDE 5 MG/1
5 TABLET ORAL
Status: DISCONTINUED | OUTPATIENT
Start: 2023-12-27 | End: 2023-12-30 | Stop reason: HOSPADM

## 2023-12-27 RX ORDER — ALBUTEROL SULFATE 90 UG/1
2 AEROSOL, METERED RESPIRATORY (INHALATION) EVERY 6 HOURS PRN
Status: DISCONTINUED | OUTPATIENT
Start: 2023-12-27 | End: 2023-12-27

## 2023-12-27 RX ORDER — LABETALOL HYDROCHLORIDE 5 MG/ML
10 INJECTION, SOLUTION INTRAVENOUS EVERY 4 HOURS PRN
Status: DISCONTINUED | OUTPATIENT
Start: 2023-12-27 | End: 2023-12-29

## 2023-12-27 RX ORDER — CHOLECALCIFEROL (VITAMIN D3) 125 MCG
500 CAPSULE ORAL DAILY
Status: DISCONTINUED | OUTPATIENT
Start: 2023-12-27 | End: 2023-12-27

## 2023-12-27 RX ORDER — BISACODYL 10 MG
10 SUPPOSITORY, RECTAL RECTAL
Status: DISCONTINUED | OUTPATIENT
Start: 2023-12-27 | End: 2023-12-30 | Stop reason: HOSPADM

## 2023-12-27 RX ORDER — SODIUM CHLORIDE 9 MG/ML
INJECTION, SOLUTION INTRAVENOUS CONTINUOUS
Status: DISCONTINUED | OUTPATIENT
Start: 2023-12-27 | End: 2023-12-27

## 2023-12-27 RX ORDER — FAMOTIDINE 40 MG/1
40 TABLET, FILM COATED ORAL 2 TIMES DAILY
COMMUNITY

## 2023-12-27 RX ORDER — POLYETHYLENE GLYCOL 3350 17 G/17G
1 POWDER, FOR SOLUTION ORAL
Status: DISCONTINUED | OUTPATIENT
Start: 2023-12-27 | End: 2023-12-30 | Stop reason: HOSPADM

## 2023-12-27 RX ORDER — FLUTICASONE PROPIONATE AND SALMETEROL 500; 50 UG/1; UG/1
1 POWDER RESPIRATORY (INHALATION) EVERY 12 HOURS
COMMUNITY

## 2023-12-27 RX ORDER — LEVOTHYROXINE SODIUM 0.12 MG/1
125 TABLET ORAL
Status: DISCONTINUED | OUTPATIENT
Start: 2023-12-27 | End: 2023-12-27

## 2023-12-27 RX ORDER — SODIUM CHLORIDE 9 MG/ML
INJECTION, SOLUTION INTRAVENOUS CONTINUOUS
Status: DISCONTINUED | OUTPATIENT
Start: 2023-12-27 | End: 2023-12-28

## 2023-12-27 RX ORDER — ONDANSETRON 4 MG/1
4 TABLET, ORALLY DISINTEGRATING ORAL EVERY 4 HOURS PRN
Status: DISCONTINUED | OUTPATIENT
Start: 2023-12-27 | End: 2023-12-30 | Stop reason: HOSPADM

## 2023-12-27 RX ORDER — OXYCODONE HYDROCHLORIDE 5 MG/1
2.5 TABLET ORAL
Status: DISCONTINUED | OUTPATIENT
Start: 2023-12-27 | End: 2023-12-30 | Stop reason: HOSPADM

## 2023-12-27 RX ORDER — ACETAMINOPHEN 325 MG/1
650 TABLET ORAL EVERY 6 HOURS PRN
Status: DISCONTINUED | OUTPATIENT
Start: 2023-12-27 | End: 2023-12-30 | Stop reason: HOSPADM

## 2023-12-27 RX ORDER — AMOXICILLIN 250 MG
2 CAPSULE ORAL 2 TIMES DAILY
Status: DISCONTINUED | OUTPATIENT
Start: 2023-12-27 | End: 2023-12-30 | Stop reason: HOSPADM

## 2023-12-27 RX ADMIN — ONDANSETRON 4 MG: 2 INJECTION INTRAMUSCULAR; INTRAVENOUS at 22:49

## 2023-12-27 RX ADMIN — PANTOPRAZOLE SODIUM 8 MG/HR: 40 INJECTION, POWDER, FOR SOLUTION INTRAVENOUS at 08:09

## 2023-12-27 RX ADMIN — PANTOPRAZOLE SODIUM 8 MG/HR: 40 INJECTION, POWDER, FOR SOLUTION INTRAVENOUS at 21:58

## 2023-12-27 RX ADMIN — BUDESONIDE 0.25 MG: 0.25 SUSPENSION RESPIRATORY (INHALATION) at 20:28

## 2023-12-27 RX ADMIN — SODIUM CHLORIDE: 9 INJECTION, SOLUTION INTRAVENOUS at 22:15

## 2023-12-27 RX ADMIN — SODIUM CHLORIDE: 9 INJECTION, SOLUTION INTRAVENOUS at 08:13

## 2023-12-27 RX ADMIN — Medication 1 APPLICATOR: at 17:47

## 2023-12-27 RX ADMIN — MORPHINE SULFATE 2 MG: 4 INJECTION, SOLUTION INTRAMUSCULAR; INTRAVENOUS at 22:42

## 2023-12-27 RX ADMIN — Medication 1 APPLICATOR: at 13:31

## 2023-12-27 ASSESSMENT — LIFESTYLE VARIABLES
CONSUMPTION TOTAL: NEGATIVE
HOW MANY TIMES IN THE PAST YEAR HAVE YOU HAD 5 OR MORE DRINKS IN A DAY: 0
ON A TYPICAL DAY WHEN YOU DRINK ALCOHOL HOW MANY DRINKS DO YOU HAVE: 0
EVER HAD A DRINK FIRST THING IN THE MORNING TO STEADY YOUR NERVES TO GET RID OF A HANGOVER: NO
EVER FELT BAD OR GUILTY ABOUT YOUR DRINKING: NO
TOTAL SCORE: 0
DOES PATIENT WANT TO STOP DRINKING: NO
TOTAL SCORE: 0
HAVE PEOPLE ANNOYED YOU BY CRITICIZING YOUR DRINKING: NO
SUBSTANCE_ABUSE: 0
TOTAL SCORE: 0
HAVE YOU EVER FELT YOU SHOULD CUT DOWN ON YOUR DRINKING: NO
ALCOHOL_USE: YES
AVERAGE NUMBER OF DAYS PER WEEK YOU HAVE A DRINK CONTAINING ALCOHOL: 2

## 2023-12-27 ASSESSMENT — ENCOUNTER SYMPTOMS
FEVER: 0
TREMORS: 0
POLYDIPSIA: 0
HEADACHES: 0
DIZZINESS: 0
SORE THROAT: 0
WEIGHT LOSS: 1
HEMOPTYSIS: 0
CHILLS: 0
ABDOMINAL PAIN: 1
FLANK PAIN: 0
BACK PAIN: 0
LOSS OF CONSCIOUSNESS: 0
SPUTUM PRODUCTION: 0
PALPITATIONS: 0
COUGH: 0
BRUISES/BLEEDS EASILY: 0
SPEECH CHANGE: 0
VOMITING: 0
ORTHOPNEA: 0
DIZZINESS: 1
CONSTIPATION: 0
BLURRED VISION: 0
SHORTNESS OF BREATH: 0
MYALGIAS: 0
DOUBLE VISION: 0
BLOOD IN STOOL: 0
WEAKNESS: 1
HEARTBURN: 0
HALLUCINATIONS: 0
ABDOMINAL PAIN: 0
FALLS: 0
FOCAL WEAKNESS: 0
NAUSEA: 0
NERVOUS/ANXIOUS: 0
PHOTOPHOBIA: 0
DIARRHEA: 0
NECK PAIN: 0

## 2023-12-27 ASSESSMENT — PAIN DESCRIPTION - PAIN TYPE
TYPE: ACUTE PAIN

## 2023-12-27 ASSESSMENT — COGNITIVE AND FUNCTIONAL STATUS - GENERAL
DAILY ACTIVITIY SCORE: 24
SUGGESTED CMS G CODE MODIFIER DAILY ACTIVITY: CH

## 2023-12-27 ASSESSMENT — FIBROSIS 4 INDEX: FIB4 SCORE: 4.28

## 2023-12-27 ASSESSMENT — PATIENT HEALTH QUESTIONNAIRE - PHQ9
2. FEELING DOWN, DEPRESSED, IRRITABLE, OR HOPELESS: NOT AT ALL
1. LITTLE INTEREST OR PLEASURE IN DOING THINGS: NOT AT ALL
SUM OF ALL RESPONSES TO PHQ9 QUESTIONS 1 AND 2: 0

## 2023-12-27 ASSESSMENT — COPD QUESTIONNAIRES
DURING THE PAST 4 WEEKS HOW MUCH DID YOU FEEL SHORT OF BREATH: NONE/LITTLE OF THE TIME
DO YOU EVER COUGH UP ANY MUCUS OR PHLEGM?: NO/ONLY WITH OCCASIONAL COLDS OR INFECTIONS
HAVE YOU SMOKED AT LEAST 100 CIGARETTES IN YOUR ENTIRE LIFE: YES
COPD SCREENING SCORE: 4

## 2023-12-27 NOTE — ASSESSMENT & PLAN NOTE
Presumed stomach cancer  with liver metastasis on CT of the abdomen pelvis with contrast  GI consulted and anticipate EGD tomorrow  Oncology evaluated    12/29/2023  On comfort care

## 2023-12-27 NOTE — PROGRESS NOTES
"Hospital Medicine Daily Progress Note    Date of Service  12/27/2023    Chief Complaint  Sheree Pinto is a 83 y.o. female admitted 12/27/2023 with melena    Hospital Course  84yo PMHx PE on apixaban, asthma, breast CA, HTN, HLD, hypothyroidism, meningioma.  Presented to [facility] with melena.  Hgb 7.3, had been 13 one year ago.  CT showing metastatic lesions in liver and mass along gastric greater curvature.  Admitted there.  Sent to us as pt had Ab's    Interval Problem Update  \"I feel lousy\".  ROS limited by mild confusion.      DW pt's  Nilson by phone  DW GI    I have discussed this patient's plan of care and discharge plan at IDT rounds today with Case Management, Nursing, Nursing leadership, and other members of the IDT team.    Consultants/Specialty  GI    Code Status  DNAR/DNI    Disposition  The patient is not medically cleared for discharge to home or a post-acute facility.      I have placed the appropriate orders for post-discharge needs.    Review of Systems  Review of Systems   Unable to perform ROS: Other   Constitutional:  Negative for chills and fever.   HENT:  Negative for nosebleeds and sore throat.    Eyes:  Negative for blurred vision and double vision.   Respiratory:  Negative for cough and shortness of breath.    Cardiovascular:  Negative for chest pain, palpitations and leg swelling.   Gastrointestinal:  Positive for melena. Negative for abdominal pain, diarrhea, nausea and vomiting.   Genitourinary:  Negative for dysuria and urgency.   Musculoskeletal:  Negative for back pain.   Skin:  Negative for rash.   Neurological:  Positive for weakness. Negative for dizziness, loss of consciousness and headaches.        Physical Exam  Temp:  [36.1 °C (97 °F)-36.9 °C (98.5 °F)] 36.9 °C (98.5 °F)  Pulse:  [84-91] 91  Resp:  [16-21] 16  BP: (112-135)/(57-62) 112/57  SpO2:  [96 %-100 %] 100 %    Physical Exam  Constitutional:       General: She is not in acute distress.     Appearance: " Normal appearance. She is well-developed. She is not diaphoretic.   HENT:      Head: Normocephalic and atraumatic.   Neck:      Vascular: No JVD.   Cardiovascular:      Rate and Rhythm: Normal rate and regular rhythm.      Heart sounds: Murmur heard.   Pulmonary:      Effort: Pulmonary effort is normal. No respiratory distress.      Breath sounds: No stridor. No wheezing or rales.   Abdominal:      Palpations: Abdomen is soft.      Tenderness: There is no abdominal tenderness. There is no guarding or rebound.   Musculoskeletal:         General: No tenderness.      Right lower leg: No edema.      Left lower leg: No edema.   Skin:     General: Skin is warm and dry.      Findings: No rash.   Neurological:      Mental Status: She is oriented to person, place, and time.      Comments: Poor short term recall; has no idea why she's here   Psychiatric:         Mood and Affect: Mood normal.         Behavior: Behavior normal.         Fluids    Intake/Output Summary (Last 24 hours) at 12/27/2023 1125  Last data filed at 12/27/2023 1000  Gross per 24 hour   Intake 181.52 ml   Output --   Net 181.52 ml       Laboratory  Recent Labs     12/27/23  0612   WBC 8.2   RBC 1.70*   HEMOGLOBIN 5.4*   HEMATOCRIT 17.7*   MCV 99.4*   MCH 31.2   MCHC 31.4*   RDW 53.2*   PLATELETCT 203   MPV 9.2     Recent Labs     12/27/23  0612   SODIUM 138   POTASSIUM 4.3   CHLORIDE 110   CO2 21   GLUCOSE 95   BUN 39*   CREATININE 0.95   CALCIUM 6.9*                   Imaging  No orders to display        Assessment/Plan  * Upper GI bleed from stomach cancer- (present on admission)  Assessment & Plan  UGIB likely from gastric mass noted on CT from outlying facility in setting of anticoagulation on apixaban for Hx of PE  Las dose apixaban hs on 12/25  Check TEG  Serial H&H  IV PPI   Maintain good large bore PIVs  GI consulted  Anticipate EGD tomorrow  Giving 2 units PRBCs      Advance care planning  Assessment & Plan  Per d/w my partner pt is  DNR/I      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)  Assessment & Plan  Presumed stomach cancer  with liver metastasis on CT of the abdomen pelvis with contrast  GI consulted and anticipate EGD tomorrow  NPO at midnight    History of pulmonary embolism  Assessment & Plan  Has been on apixaban for pulmonary embolism diagnosed in 2020, unclear if provoked or not, but patient apparently was treated for breast cancer at that time  Last dose of apixaban was evening 12/25  Check TEG  Mechanical DVT prophylaxis given active UGIB      Acute blood loss anemia  Assessment & Plan  Patient presented with symptomatic anemia with hemoglobin 7.3 and most recently 5.7.  Outside facility unable to find compatible blood due to presence of antibodies.  Getting transfused 2 units PRBCs this am  Serial H&H  Working up UGIB  Maintain good large bore PIVs  Cont in IMCU    Essential hypertension- (present on admission)  Assessment & Plan  Was not on active therapy prior to admission  monitor         VTE prophylaxis:   SCDs/TEDs      I have performed a physical exam and reviewed and updated ROS and Plan today (12/27/2023). In review of yesterday's note (12/26/2023), there are no changes except as documented above.

## 2023-12-27 NOTE — PROGRESS NOTES
Pt tolerated transfusion of 1 unit PRBC without incident, second unit started.  Pt spoke with Dr. Paluino for plan of care, questions answered.  Daughter at bedside, updated on plan of care as well and expressed understanding.  Pt offered clear liquid diet, denies desire at this time, eating ice chips.

## 2023-12-27 NOTE — CONSULTS
Date of Consultation:  12/27/2023    Patient: : Sheree Pinto  MRN: [id number]    Referring Physician:  Franky Paulino DO     GI:CARLOS Alston     Reason for Consultation: Melena, anemia    History of Present Illness:     This is an 83-year-old female with a past medical history including pulmonary embolism on apixaban, asthma, breast cancer, hypertension, hyperlipidemia, hypothyroidism, meningioma who was transferred from [facility] to [facility] on 12/27/2023 with melena.  She reports weakness/lightheadedness and dark stools for the 2-3 days prior.  She denies nausea, vomiting, hematemesis, heartburn, abdominal pain.  Furthermore, she denies loss of appetite/change in appetite or any recent weight loss.  Upon arrival to [facility], she was found to have a hemoglobin 7.3.  Chart review shows that she had a hemoglobin of thirteen 1 year ago.  CT abdomen was also obtained showing metastatic lesions in liver and mass along gastric greater curvature.  She is transferred to [facility] for higher level of care.  Upon arrival, no leukocytosis, hemoglobin 5.4, hematocrit 17.7, MCV 99.4, platelets 203.  BUN elevated at 39, creatinine 0.95, GFR 59.  Mildly elevated LFTs with AST 48, ALT 21, alk phos 134.  Total bilirubin normal.  Albumin 2.6.  Take studies were obtained showing a decrease R time but otherwise normal.  Patient O+ but also had positive anti-M antibody.      Tobacco use: Denies  Alcohol use: Occasional glass of wine  Illicit drug use: Denies    Last EGD: Denies  Last colonoscopy: Reports recent colonoscopy with Dr. Romero within the last few months, no abnormal findings.    NSAID/ASA use: Denies  Anticoagulation use: Patient takes apixaban for pulmonary embolism, last dose 12/25/2023      Past Medical History:   Diagnosis Date    Asthma     Hypertension          Past Surgical History:   Procedure Laterality Date    APPENDECTOMY       HYSTERECTOMY RADICAL      THYROIDECTOMY         No family history on file.    Social History     Socioeconomic History    Marital status:    Tobacco Use    Smoking status: Former     Current packs/day: 0.00     Types: Cigarettes     Quit date: 1970     Years since quittin.0    Smokeless tobacco: Never   Substance and Sexual Activity    Alcohol use: Yes     Alcohol/week: 8.4 oz     Types: 14 Glasses of wine per week    Drug use: Never       Review of systems:  Review of Systems   Constitutional:  Positive for malaise/fatigue. Negative for chills and fever.   HENT:  Negative for congestion and sore throat.    Respiratory:  Negative for cough and shortness of breath.    Cardiovascular:  Negative for chest pain and leg swelling.   Gastrointestinal:  Positive for melena. Negative for abdominal pain, blood in stool, constipation, diarrhea, heartburn, nausea and vomiting.   Genitourinary:  Negative for dysuria and flank pain.   Musculoskeletal:  Negative for falls and myalgias.   Neurological:  Positive for dizziness and weakness.   Psychiatric/Behavioral:  Negative for substance abuse. The patient is not nervous/anxious.    All other systems reviewed and are negative.        Physical Exam:  Vitals:    23 1245 23 1300 23 1315 23 1330   BP: (!) 147/65 130/65 135/66    Pulse: 74 76 83 73   Resp: 15 (!) 42 (!) 32 16   Temp: 36.4 °C (97.6 °F)  36.4 °C (97.6 °F) 36.6 °C (97.9 °F)   TempSrc: Temporal  Temporal Temporal   SpO2: 100% 100% 100% 100%   Weight:           Physical Exam  Vitals and nursing note reviewed.   Constitutional:       General: She is awake. She is not in acute distress.     Appearance: She is normal weight. She is ill-appearing.   HENT:      Head: Normocephalic.      Right Ear: Tympanic membrane normal.      Nose: Nose normal. No congestion.      Mouth/Throat:      Mouth: Mucous membranes are moist.      Pharynx: Oropharynx is clear. No oropharyngeal exudate.   Eyes:       General: No scleral icterus.     Extraocular Movements: Extraocular movements intact.      Conjunctiva/sclera: Conjunctivae normal.   Cardiovascular:      Rate and Rhythm: Normal rate and regular rhythm.      Pulses: Normal pulses.      Heart sounds: Normal heart sounds.   Pulmonary:      Effort: Pulmonary effort is normal. No respiratory distress.      Breath sounds: Normal breath sounds. No wheezing.   Abdominal:      General: Abdomen is flat. Bowel sounds are normal. There is no distension.      Palpations: Abdomen is soft.      Tenderness: There is no abdominal tenderness. There is no guarding.   Musculoskeletal:      Right lower leg: No edema.      Left lower leg: No edema.   Skin:     General: Skin is warm and dry.      Capillary Refill: Capillary refill takes less than 2 seconds.      Coloration: Skin is pale. Skin is not jaundiced.   Neurological:      General: No focal deficit present.      Mental Status: She is alert and oriented to person, place, and time. Mental status is at baseline.   Psychiatric:         Mood and Affect: Mood normal.         Behavior: Behavior normal. Behavior is cooperative.         Thought Content: Thought content normal.         Judgment: Judgment normal.           Labs:  Recent Labs     12/27/23  0612   WBC 8.2   RBC 1.70*   HEMOGLOBIN 5.4*   HEMATOCRIT 17.7*   MCV 99.4*   MCH 31.2   MCHC 31.4*   RDW 53.2*   PLATELETCT 203   MPV 9.2     Recent Labs     12/27/23  0612   SODIUM 138   POTASSIUM 4.3   CHLORIDE 110   CO2 21   GLUCOSE 95   BUN 39*           Recent Labs     12/27/23  0612   ASTSGOT 48*   ALTSGPT 21   TBILIRUBIN 0.3   ALKPHOSPHAT 134*   GLOBULIN 1.9         Imaging:  MR-BRAIN-WITH & W/O  Narrative: 6/14/2020 6:43 PM    HISTORY/REASON FOR EXAM:  Breast cancer, stage I, asymptomatic, brain mets screening.    TECHNIQUE/EXAM DESCRIPTION:  MRI of the brain without and with contrast, with diffusion.    The study was performed on a Habeas 1.5 Susie MRI scanner. Spoiled-GRASS  sagittal, thin-section T2 axial, T1 coronal, T1 postcontrast coronal, T1 postcontrast axial, FLAIR coronal and diffusion-weighted axial images were obtained of the whole brain.    13 mL ProHance contrast were administered intravenously.    COMPARISON:  None.    FINDINGS:  There is a homogeneously enhancing extra-axial dural based mass measuring 21 x 20 x 20 cm at the posterior aspect of the planum sphenoidale with extension into the suprasellar cistern and left middle cranial fossa. Incidental note is made of an empty   sella. There is a pattern of mild cerebral atrophy manifest as prominence of sulcal markings over the convexities and vertex along with mild ventriculomegaly. The patient is status post cataract repair. The bony calvaria are intact. There is no evidence   of extra-axial fluid collection or intracranial mass effect.    There is a pattern of mild supratentorial white matter disease with scattered foci of bright T2 and FLAIR signal in the subcortical and deep white matter of both hemispheres consistent with small vessel ischemic change versus demyelination or gliosis.   There is no evidence of abnormal diffusion-weighted signal intensity in the brain. There is no evidence of abnormal enhancement in the brain parenchyma.    There are normal flow voids in the cavernous carotid arteries and M1 segments. There are normal flow voids in the distal vertebral basilar system. There are normal flow voids in the dural venous sinuses. There is mild left maxillary sinus, ethmoid air   cell and left sphenoid sinus mucosal thickening. There are reticular opacities in the mastoid air cells.  Impression: 1.  No evidence of acute territorial infarct, intracranial hemorrhage or mass lesion. No evidence of intracranial metastatic disease.  2.  Mild diffuse cerebral substance loss.  3.  Mild microangiopathic ischemic change versus demyelination or gliosis.  4.  21 x 20 x 20 mm extra-axial homogeneously enhancing dural based  mass at the posterior aspect of the planum sphenoidale with extension into the suprasellar cistern and left cranial fossa most consistent with meningioma. Dural-based metastasis is much   less likely but cannot entirely be excluded. Follow-up is recommended.  5.  Findings of sinusitis as described above.  6.  Bilateral mastoid effusions.  7.  Incidentally noted empty sella.            Impressions:  GI bleeding with melena  Acute blood loss anemia  Gastric mass seen on imaging  Metastatic liver lesions-seen on imaging  History of pulmonary embolism on apixaban-last dose 12/25/2023  Hypertension  Hyperlipidemia  Hypothyroidism.  Asthma  History of breast cancer    MDM:  This is an 83-year-old female with a past medical history as listed above who was transferred from [facility] to [facility] on 12/27/2023 for GI bleeding with melena.  Patient with positive antibodies and [facility] was not able to accommodate these needs.  Patient was sent here for GI bleed with melena.  CT findings also showed metastatic liver lesions and gastric mass.  Findings discussed with patient and daughter at bedside.  Patient is receiving PRBC transfusion during encounter.  Discussed further evaluation with EGD.  She is agreeable to proceed.    Recommendations:  Monitor H/H and for signs of rebleeding-transfuse as necessary  Okay to have clear liquid diet today; n.p.o. at midnight.  Okay to continue PPI  Plan for EGD with possible interventions tomorrow.    Discussed with patient, daughter at bedside, Dr. Alvarez, Dr. Paulino.      This note was generated using voice recognition software which has a small chance of producing errors of grammar and possibly content. I have made every reasonable attempt to find and correct any obvious errors, but expect that some may not be found prior to finalization of this note.

## 2023-12-27 NOTE — PROGRESS NOTES
Medication Reconciliation    Completed per home pharmacy [facility].     Per pharmacy Rx for apixaban completed in 08/2023.     Patient received Z-Sae and 5 day course of prednisone 10 mg on 11/18/23.     Agnes Yao, PharmD

## 2023-12-27 NOTE — PROGRESS NOTES
Blood transfusion initiated per order, patient consents to same.  Instructed on signs of reaction to report to RN, pt expressed understanding.

## 2023-12-27 NOTE — ASSESSMENT & PLAN NOTE
UGIB likely from gastric mass noted on CT from outlying facility in setting of anticoagulation on apixaban for Hx of PE  Las dose apixaban hs on 12/25  Serial H&H  Change po  Maintain good large bore PIVs  EGD shows large fungating mass: Bx pending  DW Onc who will see tomorrow    12/29/2023  On comfort care

## 2023-12-27 NOTE — PROGRESS NOTES
[facility] DIRECT ADMISSION REPORT  Transferring facility: [facility]  Transferring physician: Zafar Beckwith    Chief complaint: melana  Pertinent history & patient course: Hx of PE in 2020 on chronic apixaban. That presented 12/26 to [place] found to have melana and acute anemia to hg 7.3 with prior baseline of 13. She last took her apixaban the evening of 12/25. This was not reversed. She has had stable vitals most recent vitals with HR 84 /61 on 2l n/c sating 94% afebrile. She has antibodies and they are unable to get blood for > 24hrs and thus asking to transfer for support. Patient with last echo 2020 with EF 64% RVSP of 35-40. Patient is DNR/DNI okay for Cpap. Good IV access  Pertinent imaging & lab results: CT scan of abdomen shows concerns of greater curvature with lesion as well as hepatic metastasis. She has INR 1.6, BMP normal with BUN of 48 cr of 1.2, co2 21. Most recent hg is 5.7.   Consultants called prior to transfer and pertinent input from consultants: GI  Code Status: DNR/DNI okay for Cpap per transferring provider, I personally verified with the transferring provider patient's code status and the transferring provider has confirmed this with the patient.  Reason for Transfer: unable to get blood  Further work up or recommendations requested prior to transfer: give uncrossed match blood if necessary    Patient accepted for transfer: Yes  Accepting [facility] Facility: [facility] - Nursing to notify the Triage Coordinator in the RTOC via Voalte or Phone ext. [phone] when patient arrives to the unit. The Triage Coordinator will assign the admitting provider.    Consultants to be called upon arrival: Hospitalist and GI  Admission status: Inpatient.   Floor requested: IMCU if overflow TICU  If ICU transfer, name of intensivist case discussed with and pertinent input from critical care: Ebenezer    The admitting provider is the point of contact for questions or concerns  regarding patient's care.

## 2023-12-27 NOTE — ASSESSMENT & PLAN NOTE
Patient presented with symptomatic anemia with hemoglobin 7.3 and most recently 5.7.  Outside facility unable to find compatible blood due to presence of antibodies.  Getting transfused 2 units PRBCs this am  Serial H&H  Working up UGIB  Maintain good large bore PIVs  Cont in IMCU      12/29/2023  On comfort care

## 2023-12-27 NOTE — ASSESSMENT & PLAN NOTE
I had a prolonged discussion with the patient  and patient's spouse regarding goals of care, diagnoses, prognosis, and CODE STATUS. We discussed  her prognosis and comorbidities. I spent 17 minutes on advanced care planning in addition to the time spent managing the other medical problems.       I had extensive conversation with patient's spouse and patient at bedside about patient poor prognosis.  Patient and spouse decided for comfort care and hospice.    Comfort care initiated, hospice referral sent,  assisting

## 2023-12-27 NOTE — PROGRESS NOTES
Call from [facility] in blood bank regarding antibodies and providing proper blood match for patient.  Unclear what antibodies are present, will ask patient to clarify if possible.  Hemoglobin 5.4 at last blood draw, order to transfuse when blood available in orders.  Lab called with critical calcium 6.9. results repeated back to lab.

## 2023-12-27 NOTE — H&P
Hospital Medicine History & Physical Note    Date of Service  12/27/2023    Primary Care Physician  Priscilla Lyons M.D.        Code Status  DNAR/DNI    Chief Complaint  Abdominal pain, melena      History of Presenting Illness  Sheree Pinto is a 83 y.o. female with past medical history of pulmonary embolism in 2020, has been on apixaban, asthma, breast cancer in 2020, meningioma, hypertension, dyslipidemia, hypothyroidism who presented 12/27/2023   She was transferred from [facility], where she was admitted yesterday with concern for melena, anemia with hemoglobin down from 13-7.3 from 1 year ago, epigastric abdominal pain and newly discovered a lesion involving the greater curvature of the stomach with perigastric adenopathy, with hepatic metastasis and mild intrahepatic ductal dilation.  After admission hemoglobin dropped from 7.3 down to 5.7.  Patient was found to have antibodies, and as such finding blood for transfusion was difficult and would take 24 hours, therefore decision was made to transfer patient here.  Last dose of apixaban was in the evening of 12/25.  It was not reversed at outside hospital.  Last solid melanotic BM was before admission to outside hospital.  She is hemodynamically stable.  She was treated with IV Protonix.  She has not had evaluation with endoscopy and never had endoscopy before.  At this time she denies any abdominal pain nausea or vomiting.  She does feel lightheaded when standing up.    I discussed the plan of care with patient and bedside RN .    Review of Systems  Review of Systems   Constitutional:  Positive for weight loss. Negative for chills and fever.   HENT:  Negative for ear pain, hearing loss and tinnitus.    Eyes:  Negative for blurred vision, double vision and photophobia.   Respiratory:  Negative for cough, hemoptysis and sputum production.    Cardiovascular:  Negative for chest pain, palpitations and orthopnea.   Gastrointestinal:  Positive for  abdominal pain and melena. Negative for heartburn, nausea and vomiting.   Genitourinary:  Negative for dysuria, flank pain, frequency and hematuria.   Musculoskeletal:  Negative for back pain, joint pain and neck pain.   Skin:  Negative for itching and rash.   Neurological:  Negative for tremors, speech change, focal weakness and headaches.   Endo/Heme/Allergies:  Negative for environmental allergies and polydipsia. Does not bruise/bleed easily.   Psychiatric/Behavioral:  Negative for hallucinations and substance abuse. The patient is not nervous/anxious.        Past Medical History   has a past medical history of Asthma and Hypertension.    Surgical History   has a past surgical history that includes thyroidectomy; appendectomy; and hysterectomy radical.     Family History  family history is not on file.   Family history reviewed with patient. There is no family history that is pertinent to the chief complaint.     Social History   reports that she quit smoking about 54 years ago. She has never used smokeless tobacco. She reports current alcohol use of about 8.4 oz of alcohol per week. She reports that she does not use drugs.    Allergies  No Known Allergies    Medications  Prior to Admission Medications   Prescriptions Last Dose Informant Patient Reported? Taking?   DILTIAZem (CARDIZEM) 120 MG Tab   Yes No   Sig: Take 240 mg by mouth 3 times a day.   NS SOLN 60 mL with albuterol 2.5 mg/0.5 mL NEBU 100 mg   Yes No   Sig: by Nebulization route.   albuterol 108 (90 Base) MCG/ACT Aero Soln inhalation aerosol   Yes No   Sig: Inhale 2 Puffs by mouth every 6 hours as needed for Shortness of Breath.   apixaban (ELIQUIS) 5mg Tab   No No   Sig: Take 2 Tabs by mouth 2 Times a Day. Indications: DVT/PE   apixaban (ELIQUIS) 5mg Tab   No No   Sig: Take 1 Tab by mouth 2 Times a Day. Indications: DVT/PE   budesonide (PULMICORT) 0.25 MG/2ML Suspension   Yes No   Si mcg by Nebulization route 2 times a day.   cyanocobalamin  (VITAMIN B-12) 500 MCG Tab   Yes No   Sig: Take 5,000 mcg by mouth every day.   furosemide (LASIX) 20 MG Tab   Yes No   Sig: Take 20 mg by mouth every day.   ipratropium (ATROVENT) 0.02 % Solution   Yes No   Si.2 mg by Nebulization route.   iron dextran complex (INFED) 50 MG/ML Solution   Yes No   Si mg every day.   levothyroxine (SYNTHROID) 125 MCG Tab   Yes No   Sig: Take 125 mcg by mouth Every morning on an empty stomach.   lisinopril (PRINIVIL) 20 MG Tab   Yes No   Sig: Take 20 mg by mouth every day.   magnesium hydroxide (MILK OF MAGNESIA) 400 MG/5ML Suspension   Yes No   Sig: Take 30 mL by mouth 1 time daily as needed.   magnesium oxide (MAG-OX) 400 MG Tab tablet   Yes No   Sig: Take 400 mg by mouth every day.   potassium chloride (KLOR-CON) 20 MEQ Pack   Yes No   Sig: Take 20 mEq by mouth 2 times a day.      Facility-Administered Medications: None       Physical Exam                             Physical Exam  Vitals and nursing note reviewed.   Constitutional:       General: She is not in acute distress.     Appearance: Normal appearance. She is ill-appearing.   HENT:      Head: Normocephalic and atraumatic.      Nose: Nose normal.      Mouth/Throat:      Mouth: Mucous membranes are moist.   Eyes:      Extraocular Movements: Extraocular movements intact.      Pupils: Pupils are equal, round, and reactive to light.   Cardiovascular:      Rate and Rhythm: Normal rate and regular rhythm.   Pulmonary:      Effort: Pulmonary effort is normal.      Breath sounds: Normal breath sounds.   Abdominal:      General: Abdomen is flat. There is no distension.      Tenderness: There is no abdominal tenderness.   Musculoskeletal:         General: No swelling or deformity. Normal range of motion.      Cervical back: Normal range of motion and neck supple.   Skin:     General: Skin is warm and dry.      Coloration: Skin is pale.   Neurological:      General: No focal deficit present.      Mental Status: She is alert  "and oriented to person, place, and time.   Psychiatric:         Mood and Affect: Mood normal.         Behavior: Behavior normal.         Laboratory:          No results for input(s): \"ALTSGPT\", \"ASTSGOT\", \"ALKPHOSPHAT\", \"TBILIRUBIN\", \"DBILIRUBIN\", \"GAMMAGT\", \"AMYLASE\", \"LIPASE\", \"ALB\", \"PREALBUMIN\", \"GLUCOSE\" in the last 72 hours.      No results for input(s): \"NTPROBNP\" in the last 72 hours.      No results for input(s): \"TROPONINT\" in the last 72 hours.    Imaging:  No orders to display           Assessment/Plan:  Justification for Admission Status  I anticipate this patient will require at least two midnights for appropriate medical management, necessitating inpatient admission because GI bleed    Patient will need a Intermediate Care (Adult and Pediatrics) bed on MEDICAL service .  The need is secondary to GI bleeding.    * Upper GI bleed from stomach cancer- (present on admission)  Assessment & Plan  Will plan to consult gastroenterology for endoscopic treatment  Continue IV Protonix drip  Monitor H&H and transfuse for hemoglobin 8.0 and below  Continue holding apixaban      Acute blood loss anemia  Assessment & Plan  Patient presented with symptomatic anemia with hemoglobin 7.3 and most recently 5.7.  Outside facility unable to find compatible blood due to presence of antibodies.  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  Given presence of malignancy patient may be at high risk for  DVT PE    Essential hypertension- (present on admission)  Assessment & Plan  Continue diltiazem, hold lisinopril and Lasix  Monitor blood pressure    Advance care planning  Assessment & Plan  I discussed advance care planning with the patient's family for at least 17 minutes, including diagnosis, prognosis, plan of care, risks and benefits of any therapies that could be offered, as well as alternatives including palliation and hospice, as appropriate. Patient has been made a DNR now. BILL CODE 34608.          VTE prophylaxis: SCDs/TEDs

## 2023-12-27 NOTE — PROGRESS NOTES
Pt to T911 by care flight. Dr. Sol notified of admission.     /62  HR 74  O2 99% on 2L NC  RR 16  97.4° F  58.6 kg      4 Eyes Skin Assessment Completed by TRUDY Stover and TRUDY Chopra.    Head WDL  Ears WDL  Nose WDL  Mouth WDL  Neck WDL  Breast/Chest WDL  Shoulder Blades WDL  Spine WDL  (R) Arm/Elbow/Hand Bruising to upper arm  (L) Arm/Elbow/Hand Redness and Blanching to elbow   Abdomen WDL  Groin WDL  Coccyx/Buttocks WDL  (R) Leg Abrasion and scab to right knee and shin   (L) Leg Bruising/abrasions to knee   (R) Heel/Foot/Toe WDL  (L) Heel/Foot/Toe WDL      Devices In Places ECG, Blood Pressure Cuff, Pulse Ox, and SCD's      Interventions In Place NC W/Ear Foams, Sacral Mepilex, TAP System, Pillows, and Q2 Turns    Possible Skin Injury No    Pictures Uploaded Into Epic Yes  Wound Consult Placed N/A  RN Wound Prevention Protocol Ordered No       PT belongings: SAMM hose, PJ pants, underwear    Dr. Constantino and Dr. Sol notified of patient arrival

## 2023-12-27 NOTE — ASSESSMENT & PLAN NOTE
Has been on apixaban for pulmonary embolism diagnosed in 2020, unclear if provoked or not, but patient apparently was treated for breast cancer at that time  Last dose of apixaban was evening 12/25  Check TEG  Mechanical DVT prophylaxis given active UGIB

## 2023-12-28 ENCOUNTER — ANESTHESIA EVENT (OUTPATIENT)
Dept: SURGERY | Facility: MEDICAL CENTER | Age: 83
DRG: 375 | End: 2023-12-28
Payer: COMMERCIAL

## 2023-12-28 ENCOUNTER — ANESTHESIA (OUTPATIENT)
Dept: SURGERY | Facility: MEDICAL CENTER | Age: 83
DRG: 375 | End: 2023-12-28
Payer: COMMERCIAL

## 2023-12-28 PROBLEM — J45.909 MILD ASTHMA: Status: ACTIVE | Noted: 2023-12-28

## 2023-12-28 PROBLEM — E03.9 ACQUIRED HYPOTHYROIDISM: Status: ACTIVE | Noted: 2023-12-28

## 2023-12-28 LAB
ALBUMIN SERPL BCP-MCNC: 2.3 G/DL (ref 3.2–4.9)
ALBUMIN/GLOB SERPL: 1.4 G/DL
ALP SERPL-CCNC: 108 U/L (ref 30–99)
ALT SERPL-CCNC: 22 U/L (ref 2–50)
ANION GAP SERPL CALC-SCNC: 8 MMOL/L (ref 7–16)
AST SERPL-CCNC: 53 U/L (ref 12–45)
BASOPHILS # BLD AUTO: 0.9 % (ref 0–1.8)
BASOPHILS # BLD: 0.07 K/UL (ref 0–0.12)
BILIRUB SERPL-MCNC: 0.9 MG/DL (ref 0.1–1.5)
BUN SERPL-MCNC: 39 MG/DL (ref 8–22)
CALCIUM ALBUM COR SERPL-MCNC: 7.8 MG/DL (ref 8.5–10.5)
CALCIUM SERPL-MCNC: 6.4 MG/DL (ref 8.5–10.5)
CHLORIDE SERPL-SCNC: 116 MMOL/L (ref 96–112)
CO2 SERPL-SCNC: 18 MMOL/L (ref 20–33)
CREAT SERPL-MCNC: 0.72 MG/DL (ref 0.5–1.4)
EKG IMPRESSION: NORMAL
EOSINOPHIL # BLD AUTO: 0.58 K/UL (ref 0–0.51)
EOSINOPHIL NFR BLD: 7.6 % (ref 0–6.9)
ERYTHROCYTE [DISTWIDTH] IN BLOOD BY AUTOMATED COUNT: 63.1 FL (ref 35.9–50)
GFR SERPLBLD CREATININE-BSD FMLA CKD-EPI: 83 ML/MIN/1.73 M 2
GLOBULIN SER CALC-MCNC: 1.6 G/DL (ref 1.9–3.5)
GLUCOSE SERPL-MCNC: 75 MG/DL (ref 65–99)
HCT VFR BLD AUTO: 21.3 % (ref 37–47)
HGB BLD-MCNC: 6.9 G/DL (ref 12–16)
HGB BLD-MCNC: 7.8 G/DL (ref 12–16)
HGB BLD-MCNC: 8.9 G/DL (ref 12–16)
IMM GRANULOCYTES # BLD AUTO: 0.03 K/UL (ref 0–0.11)
IMM GRANULOCYTES NFR BLD AUTO: 0.4 % (ref 0–0.9)
LYMPHOCYTES # BLD AUTO: 2.15 K/UL (ref 1–4.8)
LYMPHOCYTES NFR BLD: 28.1 % (ref 22–41)
MCH RBC QN AUTO: 30.5 PG (ref 27–33)
MCHC RBC AUTO-ENTMCNC: 32.4 G/DL (ref 32.2–35.5)
MCV RBC AUTO: 94.2 FL (ref 81.4–97.8)
MONOCYTES # BLD AUTO: 0.84 K/UL (ref 0–0.85)
MONOCYTES NFR BLD AUTO: 11 % (ref 0–13.4)
NEUTROPHILS # BLD AUTO: 3.99 K/UL (ref 1.82–7.42)
NEUTROPHILS NFR BLD: 52 % (ref 44–72)
NRBC # BLD AUTO: 0 K/UL
NRBC BLD-RTO: 0 /100 WBC (ref 0–0.2)
PATHOLOGY CONSULT NOTE: NORMAL
PLATELET # BLD AUTO: 138 K/UL (ref 164–446)
PMV BLD AUTO: 9 FL (ref 9–12.9)
POTASSIUM SERPL-SCNC: 3.9 MMOL/L (ref 3.6–5.5)
PROT SERPL-MCNC: 3.9 G/DL (ref 6–8.2)
RBC # BLD AUTO: 2.26 M/UL (ref 4.2–5.4)
SODIUM SERPL-SCNC: 142 MMOL/L (ref 135–145)
WBC # BLD AUTO: 7.7 K/UL (ref 4.8–10.8)

## 2023-12-28 PROCEDURE — A9270 NON-COVERED ITEM OR SERVICE: HCPCS | Performed by: INTERNAL MEDICINE

## 2023-12-28 PROCEDURE — 88341 IMHCHEM/IMCYTCHM EA ADD ANTB: CPT | Mod: 91

## 2023-12-28 PROCEDURE — C9113 INJ PANTOPRAZOLE SODIUM, VIA: HCPCS | Performed by: INTERNAL MEDICINE

## 2023-12-28 PROCEDURE — 0DB68ZX EXCISION OF STOMACH, VIA NATURAL OR ARTIFICIAL OPENING ENDOSCOPIC, DIAGNOSTIC: ICD-10-PCS | Performed by: INTERNAL MEDICINE

## 2023-12-28 PROCEDURE — 93010 ELECTROCARDIOGRAM REPORT: CPT | Performed by: STUDENT IN AN ORGANIZED HEALTH CARE EDUCATION/TRAINING PROGRAM

## 2023-12-28 PROCEDURE — 85025 COMPLETE CBC W/AUTO DIFF WBC: CPT

## 2023-12-28 PROCEDURE — 160035 HCHG PACU - 1ST 60 MINS PHASE I: Performed by: INTERNAL MEDICINE

## 2023-12-28 PROCEDURE — 700101 HCHG RX REV CODE 250: Performed by: INTERNAL MEDICINE

## 2023-12-28 PROCEDURE — 502240 HCHG MISC OR SUPPLY RC 0272: Performed by: INTERNAL MEDICINE

## 2023-12-28 PROCEDURE — 43239 EGD BIOPSY SINGLE/MULTIPLE: CPT | Performed by: INTERNAL MEDICINE

## 2023-12-28 PROCEDURE — 94640 AIRWAY INHALATION TREATMENT: CPT

## 2023-12-28 PROCEDURE — 160048 HCHG OR STATISTICAL LEVEL 1-5: Performed by: INTERNAL MEDICINE

## 2023-12-28 PROCEDURE — 85018 HEMOGLOBIN: CPT

## 2023-12-28 PROCEDURE — P9016 RBC LEUKOCYTES REDUCED: HCPCS

## 2023-12-28 PROCEDURE — 700102 HCHG RX REV CODE 250 W/ 637 OVERRIDE(OP): Performed by: INTERNAL MEDICINE

## 2023-12-28 PROCEDURE — 36415 COLL VENOUS BLD VENIPUNCTURE: CPT

## 2023-12-28 PROCEDURE — 88342 IMHCHEM/IMCYTCHM 1ST ANTB: CPT

## 2023-12-28 PROCEDURE — 700111 HCHG RX REV CODE 636 W/ 250 OVERRIDE (IP): Mod: JZ | Performed by: INTERNAL MEDICINE

## 2023-12-28 PROCEDURE — 93005 ELECTROCARDIOGRAM TRACING: CPT | Performed by: ANESTHESIOLOGY

## 2023-12-28 PROCEDURE — 700105 HCHG RX REV CODE 258: Performed by: ANESTHESIOLOGY

## 2023-12-28 PROCEDURE — 80053 COMPREHEN METABOLIC PANEL: CPT

## 2023-12-28 PROCEDURE — A9270 NON-COVERED ITEM OR SERVICE: HCPCS | Performed by: HOSPITALIST

## 2023-12-28 PROCEDURE — 88305 TISSUE EXAM BY PATHOLOGIST: CPT

## 2023-12-28 PROCEDURE — 36430 TRANSFUSION BLD/BLD COMPNT: CPT

## 2023-12-28 PROCEDURE — 700102 HCHG RX REV CODE 250 W/ 637 OVERRIDE(OP): Performed by: HOSPITALIST

## 2023-12-28 PROCEDURE — 160002 HCHG RECOVERY MINUTES (STAT): Performed by: INTERNAL MEDICINE

## 2023-12-28 PROCEDURE — 770004 HCHG ROOM/CARE - ONCOLOGY PRIVATE *

## 2023-12-28 PROCEDURE — 700111 HCHG RX REV CODE 636 W/ 250 OVERRIDE (IP): Performed by: ANESTHESIOLOGY

## 2023-12-28 PROCEDURE — 700105 HCHG RX REV CODE 258: Performed by: INTERNAL MEDICINE

## 2023-12-28 PROCEDURE — 160203 HCHG ENDO MINUTES - 1ST 30 MINS LEVEL 4: Performed by: INTERNAL MEDICINE

## 2023-12-28 PROCEDURE — 700101 HCHG RX REV CODE 250: Performed by: ANESTHESIOLOGY

## 2023-12-28 PROCEDURE — 160009 HCHG ANES TIME/MIN: Performed by: INTERNAL MEDICINE

## 2023-12-28 PROCEDURE — 99232 SBSQ HOSP IP/OBS MODERATE 35: CPT | Performed by: HOSPITALIST

## 2023-12-28 RX ORDER — ONDANSETRON 2 MG/ML
4 INJECTION INTRAMUSCULAR; INTRAVENOUS
Status: DISCONTINUED | OUTPATIENT
Start: 2023-12-28 | End: 2023-12-28 | Stop reason: HOSPADM

## 2023-12-28 RX ORDER — SODIUM CHLORIDE, SODIUM LACTATE, POTASSIUM CHLORIDE, CALCIUM CHLORIDE 600; 310; 30; 20 MG/100ML; MG/100ML; MG/100ML; MG/100ML
INJECTION, SOLUTION INTRAVENOUS
Status: DISCONTINUED | OUTPATIENT
Start: 2023-12-28 | End: 2023-12-28 | Stop reason: SURG

## 2023-12-28 RX ORDER — SODIUM CHLORIDE, SODIUM LACTATE, POTASSIUM CHLORIDE, CALCIUM CHLORIDE 600; 310; 30; 20 MG/100ML; MG/100ML; MG/100ML; MG/100ML
INJECTION, SOLUTION INTRAVENOUS CONTINUOUS
Status: DISCONTINUED | OUTPATIENT
Start: 2023-12-28 | End: 2023-12-28 | Stop reason: HOSPADM

## 2023-12-28 RX ORDER — LIDOCAINE HYDROCHLORIDE 20 MG/ML
INJECTION, SOLUTION EPIDURAL; INFILTRATION; INTRACAUDAL; PERINEURAL PRN
Status: DISCONTINUED | OUTPATIENT
Start: 2023-12-28 | End: 2023-12-28 | Stop reason: SURG

## 2023-12-28 RX ORDER — OMEPRAZOLE 20 MG/1
20 CAPSULE, DELAYED RELEASE ORAL DAILY
Status: DISCONTINUED | OUTPATIENT
Start: 2023-12-28 | End: 2023-12-29

## 2023-12-28 RX ADMIN — OXYCODONE HYDROCHLORIDE 2.5 MG: 5 TABLET ORAL at 18:05

## 2023-12-28 RX ADMIN — LIDOCAINE HYDROCHLORIDE 40 MG: 20 INJECTION, SOLUTION EPIDURAL; INFILTRATION; INTRACAUDAL at 11:34

## 2023-12-28 RX ADMIN — MORPHINE SULFATE 2 MG: 4 INJECTION, SOLUTION INTRAMUSCULAR; INTRAVENOUS at 20:14

## 2023-12-28 RX ADMIN — PROPOFOL 50 MG: 10 INJECTION, EMULSION INTRAVENOUS at 11:33

## 2023-12-28 RX ADMIN — PROPOFOL 50 MG: 10 INJECTION, EMULSION INTRAVENOUS at 11:38

## 2023-12-28 RX ADMIN — BUDESONIDE 0.25 MG: 0.25 SUSPENSION RESPIRATORY (INHALATION) at 20:57

## 2023-12-28 RX ADMIN — OMEPRAZOLE 20 MG: 20 CAPSULE, DELAYED RELEASE ORAL at 14:37

## 2023-12-28 RX ADMIN — LIDOCAINE HYDROCHLORIDE 60 MG: 20 INJECTION, SOLUTION EPIDURAL; INFILTRATION; INTRACAUDAL at 11:26

## 2023-12-28 RX ADMIN — PROPOFOL 50 MG: 10 INJECTION, EMULSION INTRAVENOUS at 11:26

## 2023-12-28 RX ADMIN — Medication 1 APPLICATOR: at 06:00

## 2023-12-28 RX ADMIN — PANTOPRAZOLE SODIUM 8 MG/HR: 40 INJECTION, POWDER, FOR SOLUTION INTRAVENOUS at 08:36

## 2023-12-28 RX ADMIN — SODIUM CHLORIDE, POTASSIUM CHLORIDE, SODIUM LACTATE AND CALCIUM CHLORIDE: 600; 310; 30; 20 INJECTION, SOLUTION INTRAVENOUS at 11:22

## 2023-12-28 ASSESSMENT — ENCOUNTER SYMPTOMS
VOMITING: 0
BACK PAIN: 0
DIZZINESS: 0
FEVER: 0
LOSS OF CONSCIOUSNESS: 0
DOUBLE VISION: 0
PALPITATIONS: 0
CHILLS: 0
NAUSEA: 0
WEAKNESS: 1
DIARRHEA: 0
COUGH: 0
BLURRED VISION: 0
SHORTNESS OF BREATH: 0
ABDOMINAL PAIN: 1
HEADACHES: 0

## 2023-12-28 ASSESSMENT — PAIN DESCRIPTION - PAIN TYPE
TYPE: ACUTE PAIN
TYPE: ACUTE PAIN
TYPE: SURGICAL PAIN
TYPE: ACUTE PAIN
TYPE: SURGICAL PAIN
TYPE: ACUTE PAIN

## 2023-12-28 ASSESSMENT — PAIN SCALES - GENERAL: PAIN_LEVEL: 0

## 2023-12-28 NOTE — PROGRESS NOTES
Transfer orders changed from [facility] to [facility].  Notified Kailey REES TICU Charge RN and CLOVIS MarcosCU TRUDY.

## 2023-12-28 NOTE — PROCEDURES
OPERATIVE REPORT    PATIENT:   Sheree Pinto   1940       PREOPERATIVE DIAGNOSES/INDICATIONS: Upper GI bleeding, r/o gastric lesion     POSTOPERATIVE DIAGNOSIS:   GERD grade B.   One fungating ulcerated mass around 6cm in size with active bleeding s/p extensive biopsy and endoclot for bleeding control. Consistent with gastric cancer.    Grossly normal duodenum.     PROCEDURE:  ESOPHAGOGASTRODUODENOSCOPY    PHYSICIAN:  Alex Alvarez MD. MPH.    ANESTHESIA:  Per anesthesiologist or ICU/ED team.     LOCATION: [facility]    CONSENT:  OBTAINED. The risks, benefits and alternatives of the procedure were discussed in details. The risks include and are not limited to bleeding, infection, perforation, missed lesions, and sedations risks (cardiopulmonary compromise and allergic reaction to medications).    DESCRIPTION: The patient presented to the procedure room.  After routine checkup was performed, patient was brought into the endoscopy suite.  Patient was placed on his left lateral decubitus position. A bite block was placed in patient's mouth. Patient was sedated by anesthesia.  Vital signs were monitored throughout the procedure.  Oxygenation support was provided throughout the procedure. Upper endoscope was inserted into patient's mouth and advanced to the second portion of the duodenum under direct visualization.      Once the site was reached and examined, the upper endoscope was withdrawn.  Retroflexion was made within the stomach.  The stomach was decompressed, scope was withdrawn and the procedure was terminated.    The patient tolerated the procedure well.  There were no immediate complications.    OPERATIVE FINDINGS:  GERD grade B.   One fungating ulcerated mass around 6cm in size with active bleeding s/p extensive biopsy and endoclot for bleeding control. Consistent with gastric cancer.    Grossly normal duodenum.     RECOMMENDATIONS:  Wait for pathology.   Okay to have regular dose of oral PPI.   Based  on the possible mets on CT scan, the patient would benefit from a palliative/oncology consult.   We will follow up on the pathology.   GI signs off.     This note has been transcribed with digital voice recognition software and although it has been reviewed may contain grammatical or word errors

## 2023-12-28 NOTE — ANESTHESIA TIME REPORT
Anesthesia Start and Stop Event Times       Date Time Event    12/28/2023 1043 Ready for Procedure     1122 Anesthesia Start     1146 Anesthesia Stop          Responsible Staff  12/28/23      Name Role Begin End    Amy Foy M.D. Anesth 1122 1146          Overtime Reason:  no overtime (within assigned shift)    Comments:

## 2023-12-28 NOTE — CARE PLAN
The patient is Watcher - Medium risk of patient condition declining or worsening    Shift Goals  Clinical Goals: Q8 hgb, hemodynamic stability  Patient Goals: Feel better,  Family Goals: ERIN    Progress made toward(s) clinical / shift goals:    Problem: Knowledge Deficit - Standard  Goal: Patient and family/care givers will demonstrate understanding of plan of care, disease process/condition, diagnostic tests and medications  Outcome: Progressing     Problem: Psychosocial  Goal: Patient's level of anxiety will decrease  Outcome: Progressing     Problem: Hemodynamics  Goal: Patient's hemodynamics, fluid balance and neurologic status will be stable or improve  Outcome: Progressing     Problem: Pain - Standard  Goal: Alleviation of pain or a reduction in pain to the patient’s comfort goal  Outcome: Progressing

## 2023-12-28 NOTE — ANESTHESIA POSTPROCEDURE EVALUATION
Patient: Sheree Pinto    Procedure Summary       Date: 12/28/23 Room / Location: [facility] ROOM 26 / SURGERY SAME DAY [facility]    Anesthesia Start: 1122 Anesthesia Stop: 1146    Procedures:       GASTROSCOPY (Esophagus)      GASTROSCOPY, WITH BIOPSY (Esophagus) Diagnosis: (GASTRIC TUMOR, ACTIVE BLEEDING)    Surgeons: Alex Alvarez M.D. Responsible Provider: Amy Foy M.D.    Anesthesia Type: MAC ASA Status: 2 - Emergent            Final Anesthesia Type: MAC  Last vitals  BP   Blood Pressure : 135/70    Temp   36.3 °C (97.4 °F)    Pulse   82   Resp   19    SpO2   93 %      Anesthesia Post Evaluation    Patient location during evaluation: PACU  Patient participation: complete - patient participated  Level of consciousness: awake  Pain score: 0    Airway patency: patent  Anesthetic complications: no  Cardiovascular status: hemodynamically stable  Respiratory status: acceptable  Hydration status: acceptable    PONV: none          No notable events documented.     Nurse Pain Score: 0 (NPRS)

## 2023-12-28 NOTE — PROGRESS NOTES
Pt arrived to T1 via gurney, monitored and on 2  NC accompanied by an ACLS RN.  Protonix gtt @ 8 mg/ hr.

## 2023-12-28 NOTE — PROGRESS NOTES
"Hospital Medicine Daily Progress Note    Date of Service  12/28/2023    Chief Complaint  Sheree Pinto is a 83 y.o. female admitted 12/27/2023 with melena    Hospital Course  84yo PMHx PE on apixaban, asthma, breast CA, HTN, HLD, hypothyroidism, meningioma.  Presented to [facility] with melena.  Hgb 7.3, had been 13 one year ago.  CT showing metastatic lesions in liver and mass along gastric greater curvature.  Admitted there.  Sent to us as pt had Ab's  EGD 12/28 showing large fungating mass suspicious for gastric cancer.  Bx's taken    Interval Problem Update  \"Some epigastric pain but no other complaints    AFebrile  Sinus 70-80s  -140  On RA    I have discussed this patient's plan of care and discharge plan at IDT rounds today with Case Management, Nursing, Nursing leadership, and other members of the IDT team.    Consultants/Specialty  GI    Code Status  DNAR/DNI    Disposition  The patient is not medically cleared for discharge to home or a post-acute facility.      I have placed the appropriate orders for post-discharge needs.    Review of Systems  Review of Systems   Unable to perform ROS: Other   Constitutional:  Negative for chills and fever.   Eyes:  Negative for blurred vision and double vision.   Respiratory:  Negative for cough and shortness of breath.    Cardiovascular:  Negative for chest pain, palpitations and leg swelling.   Gastrointestinal:  Positive for abdominal pain and melena. Negative for diarrhea, nausea and vomiting.   Genitourinary:  Negative for dysuria and urgency.   Musculoskeletal:  Negative for back pain.   Skin:  Negative for rash.   Neurological:  Positive for weakness. Negative for dizziness, loss of consciousness and headaches.        Physical Exam  Temp:  [36.1 °C (97 °F)-36.9 °C (98.5 °F)] 36.4 °C (97.6 °F)  Pulse:  [] 80  Resp:  [12-90] 12  BP: (107-168)/(57-81) 125/63  SpO2:  [95 %-100 %] 100 %    Physical Exam  Constitutional:       General: She is not in " acute distress.     Appearance: Normal appearance. She is well-developed. She is not diaphoretic.   HENT:      Head: Normocephalic and atraumatic.   Neck:      Vascular: No JVD.   Cardiovascular:      Rate and Rhythm: Normal rate and regular rhythm.      Heart sounds: Murmur heard.   Pulmonary:      Effort: Pulmonary effort is normal. No respiratory distress.      Breath sounds: No stridor. No wheezing or rales.   Abdominal:      Palpations: Abdomen is soft.      Tenderness: There is no abdominal tenderness. There is no guarding or rebound.   Musculoskeletal:         General: No tenderness.      Right lower leg: No edema.      Left lower leg: No edema.   Skin:     General: Skin is warm and dry.      Findings: No rash.   Neurological:      Mental Status: She is oriented to person, place, and time.      Comments: Poor short term recall; has no idea why she's here   Psychiatric:         Mood and Affect: Mood normal.         Behavior: Behavior normal.         Thought Content: Thought content normal.         Fluids    Intake/Output Summary (Last 24 hours) at 12/28/2023 0647  Last data filed at 12/28/2023 0400  Gross per 24 hour   Intake 2132.84 ml   Output 775 ml   Net 1357.84 ml         Laboratory  Recent Labs     12/27/23  0612 12/27/23  1615 12/27/23  2158 12/28/23  0420   WBC 8.2  --   --  7.7   RBC 1.70*  --   --  2.26*   HEMOGLOBIN 5.4* 9.1* 7.6* 6.9*   HEMATOCRIT 17.7*  --   --  21.3*   MCV 99.4*  --   --  94.2   MCH 31.2  --   --  30.5   MCHC 31.4*  --   --  32.4   RDW 53.2*  --   --  63.1*   PLATELETCT 203  --   --  138*   MPV 9.2  --   --  9.0       Recent Labs     12/27/23  0612 12/28/23  0420   SODIUM 138 142   POTASSIUM 4.3 3.9   CHLORIDE 110 116*   CO2 21 18*   GLUCOSE 95 75   BUN 39* 39*   CREATININE 0.95 0.72   CALCIUM 6.9* 6.4*                     Imaging  OUTSIDE IMAGES-CT ABDOMEN /PELVIS   Final Result           Assessment/Plan  * Upper GI bleed from stomach cancer- (present on admission)  Assessment &  Plan  UGIB likely from gastric mass noted on CT from outlying facility in setting of anticoagulation on apixaban for Hx of PE  Las dose apixaban hs on 12/25  Serial H&H  Change po  Maintain good large bore PIVs  EGD shows large fungating mass: Bx pending  DW Onc who will see tomorrow        Advance care planning  Assessment & Plan  Per d/w my partner pt is DNR/I      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)  Assessment & Plan  Presumed stomach cancer  with liver metastasis on CT of the abdomen pelvis with contrast  GI consulted and anticipate EGD tomorrow  NPO at midnight    History of pulmonary embolism  Assessment & Plan  Has been on apixaban for pulmonary embolism diagnosed in 2020, unclear if provoked or not, but patient apparently was treated for breast cancer at that time  Last dose of apixaban was evening 12/25  Check TEG  Mechanical DVT prophylaxis given active UGIB      Acute blood loss anemia  Assessment & Plan  Patient presented with symptomatic anemia with hemoglobin 7.3 and most recently 5.7.  Outside facility unable to find compatible blood due to presence of antibodies.  Getting transfused 2 units PRBCs this am  Serial H&H  Working up UGIB  Maintain good large bore PIVs  Cont in IMCU    Essential hypertension- (present on admission)  Assessment & Plan  Was not on active therapy prior to admission  monitor         VTE prophylaxis:   SCDs/TEDs      I have performed a physical exam and reviewed and updated ROS and Plan today (12/28/2023). In review of yesterday's note (12/27/2023), there are no changes except as documented above.

## 2023-12-28 NOTE — CARE PLAN
"The patient is Watcher - Medium risk of patient condition declining or worsening    Shift Goals  Clinical Goals: Transfusions when blood available, GI work up  Patient Goals: Feel less dizzy, figure out bleeding source  Family Goals: Stay updated on plan of care    Progress made toward(s) clinical / shift goals:  Transfusions completed.    Patient is not progressing towards the following goals: Awaiting test to determine source of bleeding.      Problem: Knowledge Deficit - Standard  Goal: Patient and family/care givers will demonstrate understanding of plan of care, disease process/condition, diagnostic tests and medications  Outcome: Progressing:  Pt and family state understanding of plan of care for EGD on Thursday 12/28.      Problem: Hemodynamics  Goal: Patient's hemodynamics, fluid balance and neurologic status will be stable or improve  Outcome: Progressing:  Pt blood pressure and pulse stable throughout the day, NS off until midnight, pt tolerated 2 units PRBC without incident, states she is \"much better and less dizzy than this morning\".      Problem: Pain - Standard  Goal: Alleviation of pain or a reduction in pain to the patient’s comfort goal  Outcome: Progressing:  Pt with slight c/o abdominal pain throughout the day.  Offered medication, pt declined, used heat pack for comfort.      Problem: Psychosocial  Goal: Patient's level of anxiety will decrease  Outcome: Progressing:  Pt anxious about being in hospital, states she is \"not used to sitting around\" and is \"bored\".  Offered TV, book, magazines; pt declined, just \"wants this to be done with so I can go home\".  Therapeutic listening and communication used to help alleviate patients emotional discomfort.                  "

## 2023-12-28 NOTE — PROGRESS NOTES
"Pt tolerated second unit PRBC without issues, CBC sent 1 hour post transfusion.  Results from lab called to RN, Hglb 9.1 post 2 units PRBC.  Pt up to bathroom, passed large black/burgundy stool, states she \"feels crappy\" and has slight abdominal pain.  Medication offered, pt declined and using heat pack for comfort.  Plan for EGD tomorrow, pt aware and agrees.    "

## 2023-12-28 NOTE — CARE PLAN
The patient is Watcher - Medium risk of patient condition declining or worsening    Shift Goals  Clinical Goals: Q8 hgb, hemodynamic stability  Patient Goals: Feel better,  Family Goals: ERIN    Progress made toward(s) clinical / shift goals:    Problem: Pain - Standard  Goal: Alleviation of pain or a reduction in pain to the patient’s comfort goal  Outcome: Progressing     Problem: Hemodynamics  Goal: Patient's hemodynamics, fluid balance and neurologic status will be stable or improve  Outcome: Progressing     Problem: Psychosocial  Goal: Patient's level of anxiety will decrease  Outcome: Progressing     Problem: Knowledge Deficit - Standard  Goal: Patient and family/care givers will demonstrate understanding of plan of care, disease process/condition, diagnostic tests and medications  Outcome: Progressing       Patient is not progressing towards the following goals:

## 2023-12-28 NOTE — ANESTHESIA PREPROCEDURE EVALUATION
Case: [id number] Date/Time: 12/28/23 1010    Procedure: GASTROSCOPY    Location: [facility] ROOM 26 / SURGERY SAME DAY [facility]    Surgeons: Alex Alvarez M.D.          82 yo w/melena and anemia requiring transfusion.  Gastric mass noted on CT scan.  Denies N/V.    Relevant Problems   ANESTHESIA (within normal limits)      PULMONARY   (positive) Mild asthma      CARDIAC   (positive) Acute pulmonary embolism with acute cor pulmonale (HCC)   (positive) Essential hypertension   (positive) Pulmonary hypertension (HCC)     Last eliquis approx 3 days ago    Denies CAD, CP/SOB, CVA, DM, LUNG DZ, URI    Physical Exam    Airway   Mallampati: II  TM distance: >3 FB  Neck ROM: full       Cardiovascular   Rhythm: regular  Rate: normal  (-) murmur     Dental - normal exam           Pulmonary   Breath sounds clear to auscultation     Abdominal    Neurological - normal exam                   Anesthesia Plan    ASA 2- EMERGENT   ASA physical status emergent criteria: acute hemorrhage    Plan - MAC               Induction: intravenous      Pertinent diagnostic labs and testing reviewed    Informed Consent:    Anesthetic plan and risks discussed with patient.    Use of blood products discussed with: patient whom consented to blood products.

## 2023-12-29 PROBLEM — Z51.5 COMFORT MEASURES ONLY STATUS: Status: ACTIVE | Noted: 2023-12-29

## 2023-12-29 LAB
CA-I SERPL-SCNC: 1.1 MMOL/L (ref 1.1–1.3)
DAT C3D-SP REAG RBC QL: NORMAL
DAT IGG-SP REAG RBC QL: NORMAL
ERYTHROCYTE [DISTWIDTH] IN BLOOD BY AUTOMATED COUNT: 59.7 FL (ref 35.9–50)
FERRITIN SERPL-MCNC: 365 NG/ML (ref 10–291)
HCT VFR BLD AUTO: 23.2 % (ref 37–47)
HGB BLD-MCNC: 7.5 G/DL (ref 12–16)
HGB RETIC QN AUTO: 35.7 PG/CELL (ref 29–35)
IMM RETICS NFR: 28.9 % (ref 2.6–16.1)
IRON SATN MFR SERPL: 18 % (ref 15–55)
IRON SERPL-MCNC: 35 UG/DL (ref 40–170)
LDH SERPL L TO P-CCNC: 253 U/L (ref 107–266)
MCH RBC QN AUTO: 30.2 PG (ref 27–33)
MCHC RBC AUTO-ENTMCNC: 32.3 G/DL (ref 32.2–35.5)
MCV RBC AUTO: 93.5 FL (ref 81.4–97.8)
PLATELET # BLD AUTO: 137 K/UL (ref 164–446)
PMV BLD AUTO: 9.4 FL (ref 9–12.9)
PTH-INTACT SERPL-MCNC: 36.8 PG/ML (ref 14–72)
RBC # BLD AUTO: 2.48 M/UL (ref 4.2–5.4)
RETICS # AUTO: 0.12 M/UL (ref 0.04–0.12)
RETICS/RBC NFR: 5.1 % (ref 0.8–2.6)
TIBC SERPL-MCNC: 198 UG/DL (ref 250–450)
UIBC SERPL-MCNC: 163 UG/DL (ref 110–370)
WBC # BLD AUTO: 8.5 K/UL (ref 4.8–10.8)

## 2023-12-29 PROCEDURE — 82330 ASSAY OF CALCIUM: CPT

## 2023-12-29 PROCEDURE — A9270 NON-COVERED ITEM OR SERVICE: HCPCS | Performed by: INTERNAL MEDICINE

## 2023-12-29 PROCEDURE — 700102 HCHG RX REV CODE 250 W/ 637 OVERRIDE(OP): Performed by: INTERNAL MEDICINE

## 2023-12-29 PROCEDURE — A9270 NON-COVERED ITEM OR SERVICE: HCPCS | Performed by: HOSPITALIST

## 2023-12-29 PROCEDURE — 99497 ADVNCD CARE PLAN 30 MIN: CPT | Performed by: STUDENT IN AN ORGANIZED HEALTH CARE EDUCATION/TRAINING PROGRAM

## 2023-12-29 PROCEDURE — 700101 HCHG RX REV CODE 250: Performed by: INTERNAL MEDICINE

## 2023-12-29 PROCEDURE — 85027 COMPLETE CBC AUTOMATED: CPT

## 2023-12-29 PROCEDURE — 770004 HCHG ROOM/CARE - ONCOLOGY PRIVATE *

## 2023-12-29 PROCEDURE — 83970 ASSAY OF PARATHORMONE: CPT

## 2023-12-29 PROCEDURE — 36415 COLL VENOUS BLD VENIPUNCTURE: CPT

## 2023-12-29 PROCEDURE — 99233 SBSQ HOSP IP/OBS HIGH 50: CPT | Mod: 25 | Performed by: STUDENT IN AN ORGANIZED HEALTH CARE EDUCATION/TRAINING PROGRAM

## 2023-12-29 PROCEDURE — 700111 HCHG RX REV CODE 636 W/ 250 OVERRIDE (IP): Mod: JZ | Performed by: INTERNAL MEDICINE

## 2023-12-29 PROCEDURE — A9270 NON-COVERED ITEM OR SERVICE: HCPCS | Performed by: STUDENT IN AN ORGANIZED HEALTH CARE EDUCATION/TRAINING PROGRAM

## 2023-12-29 PROCEDURE — 94640 AIRWAY INHALATION TREATMENT: CPT

## 2023-12-29 PROCEDURE — 82728 ASSAY OF FERRITIN: CPT

## 2023-12-29 PROCEDURE — 700102 HCHG RX REV CODE 250 W/ 637 OVERRIDE(OP): Performed by: HOSPITALIST

## 2023-12-29 PROCEDURE — 83550 IRON BINDING TEST: CPT

## 2023-12-29 PROCEDURE — 83615 LACTATE (LD) (LDH) ENZYME: CPT

## 2023-12-29 PROCEDURE — 700102 HCHG RX REV CODE 250 W/ 637 OVERRIDE(OP): Performed by: STUDENT IN AN ORGANIZED HEALTH CARE EDUCATION/TRAINING PROGRAM

## 2023-12-29 PROCEDURE — 85046 RETICYTE/HGB CONCENTRATE: CPT

## 2023-12-29 PROCEDURE — 83540 ASSAY OF IRON: CPT

## 2023-12-29 PROCEDURE — 86880 COOMBS TEST DIRECT: CPT

## 2023-12-29 RX ORDER — DIPHENHYDRAMINE HYDROCHLORIDE, ZINC ACETATE 2; .1 G/100G; G/100G
CREAM TOPICAL 3 TIMES DAILY PRN
Status: DISCONTINUED | OUTPATIENT
Start: 2023-12-29 | End: 2023-12-30 | Stop reason: HOSPADM

## 2023-12-29 RX ORDER — HYDROMORPHONE HYDROCHLORIDE 1 MG/ML
1 INJECTION, SOLUTION INTRAMUSCULAR; INTRAVENOUS; SUBCUTANEOUS
Status: DISCONTINUED | OUTPATIENT
Start: 2023-12-29 | End: 2023-12-30 | Stop reason: HOSPADM

## 2023-12-29 RX ORDER — ATROPINE SULFATE 10 MG/ML
2 SOLUTION/ DROPS OPHTHALMIC EVERY 4 HOURS PRN
Status: DISCONTINUED | OUTPATIENT
Start: 2023-12-29 | End: 2023-12-30 | Stop reason: HOSPADM

## 2023-12-29 RX ADMIN — OXYCODONE 5 MG: 5 TABLET ORAL at 01:01

## 2023-12-29 RX ADMIN — OXYCODONE 5 MG: 5 TABLET ORAL at 14:02

## 2023-12-29 RX ADMIN — OXYCODONE 5 MG: 5 TABLET ORAL at 19:25

## 2023-12-29 RX ADMIN — OMEPRAZOLE 20 MG: 20 CAPSULE, DELAYED RELEASE ORAL at 05:20

## 2023-12-29 RX ADMIN — Medication: at 16:43

## 2023-12-29 RX ADMIN — OXYCODONE 5 MG: 5 TABLET ORAL at 10:58

## 2023-12-29 RX ADMIN — MORPHINE SULFATE 2 MG: 4 INJECTION, SOLUTION INTRAMUSCULAR; INTRAVENOUS at 16:54

## 2023-12-29 RX ADMIN — BUDESONIDE 0.25 MG: 0.25 SUSPENSION RESPIRATORY (INHALATION) at 07:46

## 2023-12-29 RX ADMIN — BUDESONIDE 0.25 MG: 0.25 SUSPENSION RESPIRATORY (INHALATION) at 19:48

## 2023-12-29 ASSESSMENT — PAIN DESCRIPTION - PAIN TYPE
TYPE: ACUTE PAIN

## 2023-12-29 ASSESSMENT — ENCOUNTER SYMPTOMS: ABDOMINAL PAIN: 0

## 2023-12-29 NOTE — DISCHARGE PLANNING
Care Transition Team Assessment    RNCM met with pt at bedside to inquire assessment information. Pt is A/Ox4 and verified demographic information via facesheet. Pt stated she lives with her  in a 1 story home with no steps to enter. Pt denies any DME use. Prior to this hospitalization, pt was independent with ADLs and IADLs. Pt has a strong support system. Pt denies any SA/MH concerns. Pt verified her medical insurance being [employer] and Medicare.    Pt is now comfort care. Pt's plan is to go home with hospice. RNCM obtained choice for [facility] and faxed to DPA. Per pt, she does not need any DME at this time from hospice.    Pending [facility] acceptance    1440: RNCM received call from [facility] who stated they can accept pt but may not be able to admit her until Tuesday due to the holiday    1500: RNCM received call from [facility] and spoke to Curtis who stated they will be able to admit pt tomorrow. RNCM spoke to pt and stated her  will pick her tomorrow morning at 0900. RNCM informed [facility] on timing.    1520: [facility] requesting pt be sent with pain medication upon DC if pt is in any pain. RNCM informed MD.    Information Source  Orientation Level: Oriented X4  Information Given By: Patient  Who is responsible for making decisions for patient? : Patient    Readmission Evaluation  Is this a readmission?: No    Elopement Risk  Legal Hold: No  Ambulatory or Self Mobile in Wheelchair: No-Not an Elopement Risk  Elopement Risk: Not at Risk for Elopement    Interdisciplinary Discharge Planning  Lives with - Patient's Self Care Capacity: Spouse  Patient or legal guardian wants to designate a caregiver: No  Support Systems: Children, Spouse / Significant Other, Friends / Neighbors  Housing / Facility: 1 Story Apartment / Condo  Durable Medical Equipment: Not Applicable    Discharge Preparedness  What is your plan after discharge?: Home with  help (home with hospice)  What are your discharge supports?: Spouse  Prior Functional Level: Independent with Activities of Daily Living, Independent with Medication Management, Ambulatory  Difficulity with ADLs: None  Difficulity with IADLs: None    Functional Assesment  Prior Functional Level: Independent with Activities of Daily Living, Independent with Medication Management, Ambulatory    Finances  Financial Barriers to Discharge: No    Vision / Hearing Impairment  Vision Impairment : No  Hearing Impairment : Yes  Hearing Impairment: Both Ears, Hearing Device Not Available  Does Pt Need Special Equipment for the Hearing Impaired?: No         Advance Directive  Advance Directive?: None    Domestic Abuse  Have you ever been the victim of abuse or violence?: No  Physical Abuse or Sexual Abuse: No  Verbal Abuse or Emotional Abuse: No  Possible Abuse/Neglect Reported to:: Not Applicable    Psychological Assessment  History of Substance Abuse: None  History of Psychiatric Problems: No  Non-compliant with Treatment: No    Discharge Risks or Barriers  Discharge risks or barriers?: No    Anticipated Discharge Information  Discharge Disposition: D/T to hospice home (50)

## 2023-12-29 NOTE — CONSULTS
Palliative   Consult and EMR reviewed.  Patient appears to be transition to comfort care measures with hospice choice made.  No palliative needs identified at this time.  Will defer consult please reconsult for further needs.    Thank you,  Jamaica Sherwood, MSN, APRN, ACNPC-AG

## 2023-12-29 NOTE — PROGRESS NOTES
4 Eyes Skin Assessment Completed by TRUDY Corbin and TRUDY Carlos.    Head WDL  Ears Redness and Blanching  Nose WDL  Mouth WDL  Neck WDL  Breast/Chest WDL  Shoulder Blades WDL  Spine WDL  (R) Arm/Elbow/Hand Redness and Blanching  (L) Arm/Elbow/Hand Redness and Blanching  Abdomen WDL  Groin WDL  Scrotum/Coccyx/Buttocks WDL  (R) Leg WDL  (L) Leg WDL  (R) Heel/Foot/Toe WDL  (L) Heel/Foot/Toe WDL          Devices In Places Nasal Cannula      Interventions In Place Gray Ear Foams, Pillows, and Pressure Redistribution Mattress    Possible Skin Injury No    Pictures Uploaded Into Epic N/A  Wound Consult Placed N/A  RN Wound Prevention Protocol Ordered No

## 2023-12-29 NOTE — RESPIRATORY CARE
COPD EDUCATION by COPD CLINICAL EDUCATOR  12/29/2023 at 6:19 AM by Orly Kilpatrick, RRT     Patient reviewed by COPD education team. Patient does not have a history or diagnosis of COPD and is a non-smoker. She has Moderate Asthma -quit smoking >50 years ago.On appropriate daily medications Therefore, patient does not qualify for the COPD program.

## 2023-12-29 NOTE — DISCHARGE PLANNING
Received choice form @: 0043  Agency/Facility name: [facility]  Sent referral per choice form @: 1203

## 2023-12-29 NOTE — CARE PLAN
The patient is Stable - Low risk of patient condition declining or worsening    Shift Goals  Clinical Goals: Stable hgb; hemodynamic stability  Patient Goals: Rest; feel better  Family Goals: no family at bedside    Progress made toward(s) clinical / shift goals:    Problem: Knowledge Deficit - Standard  Goal: Patient and family/care givers will demonstrate understanding of plan of care, disease process/condition, diagnostic tests and medications  Description: Target End Date:  1-3 days or as soon as patient condition allows    Document in Patient Education    1.  Patient and family/caregiver oriented to unit, equipment, visitation policy and means for communicating concern  2.  Complete/review Learning Assessment  3.  Assess knowledge level of disease process/condition, treatment plan, diagnostic tests and medications  4.  Explain disease process/condition, treatment plan, diagnostic tests and medications  Outcome: Progressing     Problem: Pain - Standard  Goal: Alleviation of pain or a reduction in pain to the patient’s comfort goal  Description: Target End Date:  Prior to discharge or change in level of care    Document on Vitals flowsheet    1.  Document pain using the appropriate pain scale per order or unit policy  2.  Educate and implement non-pharmacologic comfort measures (i.e. relaxation, distraction, massage, cold/heat therapy, etc.)  3.  Pain management medications as ordered  4.  Reassess pain after pain med administration per policy  5.  If opiods administered assess patient's response to pain medication is appropriate per POSS sedation scale  6.  Follow pain management plan developed in collaboration with patient and interdisciplinary team (including palliative care or pain specialists if applicable)  Outcome: Progressing       Patient is not progressing towards the following goals:

## 2023-12-29 NOTE — CARE PLAN
The patient is Stable - Low risk of patient condition declining or worsening    Shift Goals  Clinical Goals: Labs stable  Patient Goals: pain control  Family Goals: no family at bedside    Progress made toward(s) clinical / shift goals:    Problem: Psychosocial  Goal: Patient's level of anxiety will decrease  Outcome: Progressing  Goal: Patient's ability to verbalize feelings about condition will improve  Outcome: Progressing     Problem: Respiratory  Goal: Patient will achieve/maintain optimum respiratory ventilation and gas exchange  Outcome: Progressing       Patient is not progressing towards the following goals:

## 2023-12-29 NOTE — PROGRESS NOTES
Hospital Medicine Daily Progress Note    Date of Service  12/29/2023    Chief Complaint  Sheree Pinto is a 83 y.o. female admitted 12/27/2023 with neel discussed case with    Hospital Course    82yo PMHx PE on apixaban, asthma, breast CA, HTN, HLD, hypothyroidism, meningioma.  Presented to [facility] with melena.  Hgb 7.3, had been 13 one year ago.  CT showing metastatic lesions in liver and mass along gastric greater curvature.  Admitted there.  Sent to us as pt had Ab's  EGD 12/28 showing large fungating mass suspicious for gastric cancer.  Bx's taken.  Oncology evaluated. Discussed with patient's and her spouse they decided for comfort care and hospice.   assisting with hospice placement        Interval Problem Update  12/29/2023  Vitals remained stable  Labs reviewed, chart reviewed  Case was discussed with oncology Dr. Pappas      Discussed with patient's and her spouse they decided for comfort care and hospice.   assisting with hospice placement    Comfort care initiated      I have discussed this patient's plan of care and discharge plan at IDT rounds today with Case Management, Nursing, Nursing leadership, and other members of the IDT team.    Consultants/Specialty  GI and oncology    Code Status  Comfort Care/DNR    Disposition  The patient is not medically cleared for discharge to home or a post-acute facility.  Anticipate discharge to: hospice    I have placed the appropriate orders for post-discharge needs.    Review of Systems  Review of Systems   Constitutional:  Positive for malaise/fatigue.   Gastrointestinal:  Negative for abdominal pain.        Physical Exam  Temp:  [36.5 °C (97.7 °F)-37.1 °C (98.7 °F)] 36.9 °C (98.5 °F)  Pulse:  [68-90] 69  Resp:  [11-22] 16  BP: (102-151)/(5-80) 102/54  SpO2:  [94 %-100 %] 95 %    Physical Exam  Constitutional:       Appearance: She is ill-appearing.   Cardiovascular:      Rate and Rhythm: Normal rate and regular rhythm.    Pulmonary:      Effort: Pulmonary effort is normal.      Breath sounds: Normal breath sounds.   Abdominal:      General: Abdomen is flat.   Musculoskeletal:      Right lower leg: No edema.      Left lower leg: No edema.   Skin:     General: Skin is warm.   Neurological:      General: No focal deficit present.      Mental Status: She is alert and oriented to person, place, and time.   Psychiatric:         Mood and Affect: Mood normal.         Fluids    Intake/Output Summary (Last 24 hours) at 12/29/2023 1328  Last data filed at 12/29/2023 0400  Gross per 24 hour   Intake 240 ml   Output --   Net 240 ml       Laboratory  Recent Labs     12/27/23  0612 12/27/23  1615 12/28/23  0420 12/28/23  1303 12/28/23  2239 12/29/23  0024   WBC 8.2  --  7.7  --   --  8.5   RBC 1.70*  --  2.26*  --   --  2.48*   HEMOGLOBIN 5.4*   < > 6.9* 8.9* 7.8* 7.5*   HEMATOCRIT 17.7*  --  21.3*  --   --  23.2*   MCV 99.4*  --  94.2  --   --  93.5   MCH 31.2  --  30.5  --   --  30.2   MCHC 31.4*  --  32.4  --   --  32.3   RDW 53.2*  --  63.1*  --   --  59.7*   PLATELETCT 203  --  138*  --   --  137*   MPV 9.2  --  9.0  --   --  9.4    < > = values in this interval not displayed.     Recent Labs     12/27/23  0612 12/28/23  0420   SODIUM 138 142   POTASSIUM 4.3 3.9   CHLORIDE 110 116*   CO2 21 18*   GLUCOSE 95 75   BUN 39* 39*   CREATININE 0.95 0.72   CALCIUM 6.9* 6.4*                   Imaging  OUTSIDE IMAGES-CT ABDOMEN /PELVIS   Final Result           Assessment/Plan  * Upper GI bleed from stomach cancer- (present on admission)  Assessment & Plan  UGIB likely from gastric mass noted on CT from outlying facility in setting of anticoagulation on apixaban for Hx of PE  Las dose apixaban hs on 12/25  Serial H&H  Change po  Maintain good large bore PIVs  EGD shows large fungating mass: Bx pending  DW Onc who will see tomorrow    12/29/2023  On comfort care        Comfort measures only status  Assessment & Plan  Comfort care initiated on  12/2023    Advance care planning  Assessment & Plan  I had a prolonged discussion with the patient  and patient's spouse regarding goals of care, diagnoses, prognosis, and CODE STATUS. We discussed  her prognosis and comorbidities. I spent 17 minutes on advanced care planning in addition to the time spent managing the other medical problems.       I had extensive conversation with patient's spouse and patient at bedside about patient poor prognosis.  Patient and spouse decided for comfort care and hospice.    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)  Assessment & Plan  Presumed stomach cancer  with liver metastasis on CT of the abdomen pelvis with contrast  GI consulted and anticipate EGD tomorrow  Oncology evaluated    12/29/2023  On comfort care    History of pulmonary embolism  Assessment & Plan  Has been on apixaban for pulmonary embolism diagnosed in 2020, unclear if provoked or not, but patient apparently was treated for breast cancer at that time  Last dose of apixaban was evening 12/25  Check TEG  Mechanical DVT prophylaxis given active UGIB      Acute blood loss anemia  Assessment & Plan  Patient presented with symptomatic anemia with hemoglobin 7.3 and most recently 5.7.  Outside facility unable to find compatible blood due to presence of antibodies.  Getting transfused 2 units PRBCs this am  Serial H&H  Working up UGIB  Maintain good large bore PIVs  Cont in IMCU      12/29/2023  On comfort care    Essential hypertension- (present on admission)  Assessment & Plan  Was not on active therapy prior to admission  monitor         VTE prophylaxis: On comfort care    I have performed a physical exam and reviewed and updated ROS and Plan today (12/29/2023). In review of yesterday's note (12/28/2023), there are no changes except as documented above.       Greater than 51 minutes spent preparing to see patient (e.g. review  of tests) obtaining and/or reviewing separately obtained history. Performing a medically appropriate examination and/ evaluation.  Counseling and educating the patient/family/caregiver.  Ordering medications, tests, or procedures.  Referring and communicating with other health care professionals.  Documenting clinical information in EPIC.  Independently interpreting results and communicating results to patient/family/caregiver.  Care coordination.

## 2023-12-30 VITALS
BODY MASS INDEX: 24.64 KG/M2 | SYSTOLIC BLOOD PRESSURE: 101 MMHG | WEIGHT: 139.11 LBS | TEMPERATURE: 98.9 F | DIASTOLIC BLOOD PRESSURE: 57 MMHG | OXYGEN SATURATION: 94 % | HEART RATE: 87 BPM | RESPIRATION RATE: 16 BRPM

## 2023-12-30 LAB
ERYTHROCYTE [DISTWIDTH] IN BLOOD BY AUTOMATED COUNT: 59.7 FL (ref 35.9–50)
HCT VFR BLD AUTO: 21.1 % (ref 37–47)
HGB BLD-MCNC: 6.7 G/DL (ref 12–16)
MCH RBC QN AUTO: 30.7 PG (ref 27–33)
MCHC RBC AUTO-ENTMCNC: 31.8 G/DL (ref 32.2–35.5)
MCV RBC AUTO: 96.8 FL (ref 81.4–97.8)
PLATELET # BLD AUTO: 135 K/UL (ref 164–446)
PMV BLD AUTO: 9.4 FL (ref 9–12.9)
RBC # BLD AUTO: 2.18 M/UL (ref 4.2–5.4)
WBC # BLD AUTO: 8.7 K/UL (ref 4.8–10.8)

## 2023-12-30 PROCEDURE — 36415 COLL VENOUS BLD VENIPUNCTURE: CPT

## 2023-12-30 PROCEDURE — 85027 COMPLETE CBC AUTOMATED: CPT

## 2023-12-30 PROCEDURE — 700111 HCHG RX REV CODE 636 W/ 250 OVERRIDE (IP): Mod: JZ | Performed by: INTERNAL MEDICINE

## 2023-12-30 PROCEDURE — 99239 HOSP IP/OBS DSCHRG MGMT >30: CPT | Performed by: STUDENT IN AN ORGANIZED HEALTH CARE EDUCATION/TRAINING PROGRAM

## 2023-12-30 PROCEDURE — 700111 HCHG RX REV CODE 636 W/ 250 OVERRIDE (IP): Performed by: STUDENT IN AN ORGANIZED HEALTH CARE EDUCATION/TRAINING PROGRAM

## 2023-12-30 RX ORDER — OXYCODONE HYDROCHLORIDE 5 MG/1
5 TABLET ORAL EVERY 6 HOURS PRN
Qty: 10 TABLET | Refills: 0 | Status: SHIPPED | OUTPATIENT
Start: 2023-12-30 | End: 2024-01-04

## 2023-12-30 RX ADMIN — ONDANSETRON 4 MG: 2 INJECTION INTRAMUSCULAR; INTRAVENOUS at 03:23

## 2023-12-30 RX ADMIN — HYDROMORPHONE HYDROCHLORIDE 1 MG: 1 INJECTION, SOLUTION INTRAMUSCULAR; INTRAVENOUS; SUBCUTANEOUS at 03:24

## 2023-12-30 ASSESSMENT — PAIN DESCRIPTION - PAIN TYPE
TYPE: ACUTE PAIN
TYPE: ACUTE PAIN

## 2023-12-30 NOTE — PROGRESS NOTES
Discharge orders received. IV discontinued. Instructions and discharge education reviewed with patient. Follow up appointments discussed, patient will be meeting with hospice today.  Patient verbalizes understanding of discharge instructions and medications. Discharge instructions signed.

## 2023-12-30 NOTE — DISCHARGE SUMMARY
Discharge Summary    CHIEF COMPLAINT ON ADMISSION  No chief complaint on file.      Reason for Admission  Acute GIB, Acute blood loss anemia     Admission Date  12/27/2023    CODE STATUS  Comfort Care/DNR    HPI & HOSPITAL COURSE    84yo PMHx PE on apixaban, asthma, breast CA, HTN, HLD, hypothyroidism, meningioma.  Presented to [facility] with melena.  Hgb 7.3, had been 13 one year ago.  CT showing metastatic lesions in liver and mass along gastric greater curvature.  Admitted there.  Sent to us as pt had Ab's  EGD 12/28 showing large fungating mass suspicious for gastric cancer.  Bx's taken.  Oncology evaluated. Discussed with patient's and her spouse they decided for comfort care and hospice.  Accepted by [facility] hospice .         Therefore, she is discharged in guarded  to hospice.    The patient met 2-midnight criteria for an inpatient stay at the time of discharge.    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 *      FOLLOW UP  No future appointments.  [facility]  [id number] [facility] [id number]  [phone]          MEDICATIONS ON DISCHARGE     Medication List        START taking these medications        Instructions   oxyCODONE immediate-release 5 MG Tabs  Commonly known as: Roxicodone   Take 1 Tablet by mouth every 6 hours as needed for Severe Pain for up to 5 days.  Dose: 5 mg            CONTINUE taking these medications        Instructions   Advair Diskus 500-50 MCG/ACT Aepb  Generic drug: fluticasone-salmeterol    "Inhale 1 Puff every 12 hours.  Dose: 1 Puff     budesonide 0.25 MG/2ML Susp  Commonly known as: Pulmicort   250 mcg by Nebulization route 2 times a day.  Dose: 250 mcg     famotidine 40 MG Tabs  Commonly known as: Pepcid   Take 40 mg by mouth 2 times a day.  Dose: 40 mg     ipratropium 0.02 % Soln  Commonly known as: Atrovent   Take 0.2 mg by nebulization 2 times a day as needed (SOB).  Dose: 0.2 mg     ipratropium-albuterol 0.5-2.5 (3) MG/3ML nebulizer solution  Commonly known as: Duoneb   Take 3 mL by nebulization every four hours as needed for Shortness of Breath.  Dose: 3 mL              Allergies  Allergies   Allergen Reactions    Codeine Unspecified     Pt states she \"can't remember it was so long ago\".       DIET  Orders Placed This Encounter   Procedures    Diet Order Diet: Regular     Standing Status:   Standing     Number of Occurrences:   1     Order Specific Question:   Diet:     Answer:   Regular [1]       ACTIVITY  As tolerated.  Weight bearing as tolerated      LABORATORY  Lab Results   Component Value Date    SODIUM 142 12/28/2023    POTASSIUM 3.9 12/28/2023    CHLORIDE 116 (H) 12/28/2023    CO2 18 (L) 12/28/2023    GLUCOSE 75 12/28/2023    BUN 39 (H) 12/28/2023    CREATININE 0.72 12/28/2023        Lab Results   Component Value Date    WBC 8.7 12/30/2023    HEMOGLOBIN 6.7 (L) 12/30/2023    HEMATOCRIT 21.1 (L) 12/30/2023    PLATELETCT 135 (L) 12/30/2023        Total time of the discharge process exceeds 37  minutes.  "

## 2023-12-30 NOTE — DISCHARGE INSTRUCTIONS
Hospice  Hospice is a service that provides people who are terminally ill and their families with medical, spiritual, and psychological support. It aims to improve a person's quality of life by keeping the person as comfortable as possible in the final stages of life.  Who makes up the hospice care team?  The following people make up a hospice care team:  The person receiving care and his or her family.  A nurse and a hospice doctor. Your primary health care provider can also be included.  A  or .  A Presybeterian or spiritual leader.  Specialists such as:  A dietitian.  A mental health counselor.  Physical and occupational therapists.  Bereavement coordinator.  Trained volunteers who can help with care.  What services are included in hospice care?  Hospice services can vary, depending on the organization. Generally, they include:  Ways to keep you comfortable, such as:  Providing care in your home or in a home-like setting.  Providing relief from pain and symptoms. The staff will supply all medicines and equipment to keep you comfortable and alert enough to enjoy the company of friends and family.  Working with your loved ones to help them meet your needs and provide much of your basic care. This helps you to enjoy their support.  Visits or care from a nurse and doctor. This may include 24-hour on-call services.  Visits from other specialists who offer services, such as:  Counseling to meet your emotional, spiritual, and social needs as well as those needs of your family.  Massage therapy.  Nutrition therapy.  Physical and occupational therapy.  Art or music therapy.  Spiritual care to meet your needs and your family's needs. It may involve:  Helping you and your family understand the dying process.  Helping you say goodbye to your family and friends.  Performing a specific Presybeterian ceremony or ritual.  Companionship when you are alone.  Allowing you and your family to rest. Hospice staff may  do light housekeeping, prepare meals, and run errands.  Short-term inpatient care, if something cannot be managed in the home.  Bereavement support for grieving family members.  When should hospice care begin?  Most people who use hospice are believed to have 6 months or less to live.  Your family and health care providers can help you decide when hospice services should begin.  If you live longer than 6 months but your condition does not improve, your doctor may be able to approve you for continued hospice care.  If your condition improves, you may discontinue the program.  What should I consider before selecting a program?  Most hospice programs are run by nonprofit, independent organizations. Some are affiliated with hospitals, nursing homes, or home health care agencies. Hospice programs can take place in your home or at a hospice center, hospital, or skilled nursing facility. When choosing a hospice program, ask about the following:  What services are available to me and my loved ones?  What does it cost? Is it covered by insurance?  Is the program reviewed and licensed by the state or certified in some other way?  How will my pain and symptoms be managed?  Will you provide emotional and spiritual support?  If I choose a hospice center or nursing home, where is the hospice center located? Is it convenient for family and friends?  If I choose a hospice center or nursing home, will my family and friends be able to visit any time?  If my circumstances change, can the services be provided in a different setting, such as in my home or in the hospital?  Who makes up the hospice care team? How are they trained or screened?  How involved can my loved ones be?  Whom can my family call with questions?  How is my health care provider involved?  Where can I learn more about hospice?  You can learn about existing hospice programs in your area from your health care providers. The websites of the following organizations have  helpful information:  National Hospice and Palliative Care Organization: www.nhpco.org  National Association for Home [employer] & Hospice: www.nahc.org  [employer] [facility] [place]: www.hospicefoundation.org  American Cancer Society: www.cancer.org  eH[facility]ice: ehospice.com  These agencies also can provide information:  A local agency on aging.  Your local [employer] [place] chapter.  Your state's department of health or .  Summary  Hospice is a service that provides people who are terminally ill and their families with medical, spiritual, and psychological support.  Hospice aims to improve your quality of life by keeping you as comfortable as possible in the final stages of life.  Hospice teams often include a doctor, nurse, , counselor, Episcopalian or spiritual leader, dietitian, therapists, and volunteers.  Hospice care generally includes pain management, visits from doctors and nurses, physical and occupational therapy, nutrition therapy, spiritual and emotional counseling, caregiver support, and bereavement support for grieving family members.  Hospice programs can take place in your home or at a hospice center, hospital, or skilled nursing facility.  This information is not intended to replace advice given to you by your health care provider. Make sure you discuss any questions you have with your health care provider.  Document Revised: 09/21/2021 Document Reviewed: 09/21/2021  [employer] Patient Education © 2023 [employer]

## (undated) DEVICE — CANISTER SUCTION RIGID RED 1500CC (40EA/CA)

## (undated) DEVICE — CONTAINER, SPECIMEN, STERILE

## (undated) DEVICE — TUBE CONNECTING SUCTION - CLEAR PLASTIC STERILE 72 IN (50EA/CA)

## (undated) DEVICE — Device

## (undated) DEVICE — TOWEL STOP TIMEOUT SAFETY FLAG (40EA/CA)

## (undated) DEVICE — SET LEADWIRE 5 LEAD BEDSIDE DISPOSABLE ECG (1SET OF 5/EA)

## (undated) DEVICE — FORCEP RADIAL JAW 4 STANDARD CAPACITY W/NEEDLE 240CM (40EA/BX)

## (undated) DEVICE — BLOCK BITE MAXI DENTAL RETENTION RIM (100EA/BX)

## (undated) DEVICE — NEPTUNE 4 PORT MANIFOLD - (20/PK)

## (undated) DEVICE — LACTATED RINGERS INJ 1000 ML - (14EA/CA 60CA/PF)

## (undated) DEVICE — FILM CASSETTE ENDO

## (undated) DEVICE — WATER IRRIGATION STERILE 1000ML (12EA/CA)

## (undated) DEVICE — SODIUM CHL IRRIGATION 0.9% 1000ML (12EA/CA)

## (undated) DEVICE — KIT CUSTOM PROCEDURE SINGLE FOR ENDO  (15/CA)

## (undated) DEVICE — MASK PANORAMIC OXYGEN PRO2 (30EA/CA)

## (undated) DEVICE — ELECTRODE 850 FOAM ADHESIVE - HYDROGEL RADIOTRNSPRNT (50/PK)